# Patient Record
Sex: FEMALE | Race: BLACK OR AFRICAN AMERICAN | NOT HISPANIC OR LATINO | URBAN - METROPOLITAN AREA
[De-identification: names, ages, dates, MRNs, and addresses within clinical notes are randomized per-mention and may not be internally consistent; named-entity substitution may affect disease eponyms.]

---

## 2017-09-28 ENCOUNTER — EMERGENCY (EMERGENCY)
Facility: HOSPITAL | Age: 57
LOS: 1 days | Discharge: HOME | End: 2017-09-28
Attending: EMERGENCY MEDICINE | Admitting: EMERGENCY MEDICINE
Payer: COMMERCIAL

## 2017-09-28 VITALS
TEMPERATURE: 98 F | RESPIRATION RATE: 18 BRPM | OXYGEN SATURATION: 99 % | HEART RATE: 79 BPM | DIASTOLIC BLOOD PRESSURE: 122 MMHG | WEIGHT: 158.07 LBS | SYSTOLIC BLOOD PRESSURE: 187 MMHG | HEIGHT: 62 IN

## 2017-09-28 VITALS
OXYGEN SATURATION: 99 % | RESPIRATION RATE: 18 BRPM | DIASTOLIC BLOOD PRESSURE: 89 MMHG | SYSTOLIC BLOOD PRESSURE: 138 MMHG | HEART RATE: 82 BPM

## 2017-09-28 DIAGNOSIS — R42 DIZZINESS AND GIDDINESS: ICD-10-CM

## 2017-09-28 DIAGNOSIS — R51 HEADACHE: ICD-10-CM

## 2017-09-28 DIAGNOSIS — I10 ESSENTIAL (PRIMARY) HYPERTENSION: ICD-10-CM

## 2017-09-28 DIAGNOSIS — Z90.710 ACQUIRED ABSENCE OF BOTH CERVIX AND UTERUS: Chronic | ICD-10-CM

## 2017-09-28 DIAGNOSIS — E11.9 TYPE 2 DIABETES MELLITUS WITHOUT COMPLICATIONS: ICD-10-CM

## 2017-09-28 PROBLEM — Z00.00 ENCOUNTER FOR PREVENTIVE HEALTH EXAMINATION: Status: ACTIVE | Noted: 2017-09-28

## 2017-09-28 LAB
ALBUMIN SERPL ELPH-MCNC: 4.5 G/DL — SIGNIFICANT CHANGE UP (ref 3.3–5)
ALP SERPL-CCNC: 90 U/L — SIGNIFICANT CHANGE UP (ref 40–120)
ALT FLD-CCNC: 28 U/L — SIGNIFICANT CHANGE UP (ref 10–45)
ANION GAP SERPL CALC-SCNC: 16 MMOL/L — SIGNIFICANT CHANGE UP (ref 5–17)
AST SERPL-CCNC: 27 U/L — SIGNIFICANT CHANGE UP (ref 10–40)
BASOPHILS NFR BLD AUTO: 0.5 % — SIGNIFICANT CHANGE UP (ref 0–2)
BILIRUB SERPL-MCNC: 0.2 MG/DL — SIGNIFICANT CHANGE UP (ref 0.2–1.2)
BUN SERPL-MCNC: 19 MG/DL — SIGNIFICANT CHANGE UP (ref 7–23)
CALCIUM SERPL-MCNC: 10.2 MG/DL — SIGNIFICANT CHANGE UP (ref 8.4–10.5)
CHLORIDE SERPL-SCNC: 101 MMOL/L — SIGNIFICANT CHANGE UP (ref 96–108)
CO2 SERPL-SCNC: 23 MMOL/L — SIGNIFICANT CHANGE UP (ref 22–31)
CREAT SERPL-MCNC: 1.32 MG/DL — HIGH (ref 0.5–1.3)
EOSINOPHIL NFR BLD AUTO: 2 % — SIGNIFICANT CHANGE UP (ref 0–6)
GLUCOSE SERPL-MCNC: 125 MG/DL — HIGH (ref 70–99)
HCT VFR BLD CALC: 35.9 % — SIGNIFICANT CHANGE UP (ref 34.5–45)
HGB BLD-MCNC: 11.8 G/DL — SIGNIFICANT CHANGE UP (ref 11.5–15.5)
LYMPHOCYTES # BLD AUTO: 39.5 % — SIGNIFICANT CHANGE UP (ref 13–44)
MCHC RBC-ENTMCNC: 24.6 PG — LOW (ref 27–34)
MCHC RBC-ENTMCNC: 32.9 G/DL — SIGNIFICANT CHANGE UP (ref 32–36)
MCV RBC AUTO: 74.9 FL — LOW (ref 80–100)
MONOCYTES NFR BLD AUTO: 5.6 % — SIGNIFICANT CHANGE UP (ref 2–14)
NEUTROPHILS NFR BLD AUTO: 52.4 % — SIGNIFICANT CHANGE UP (ref 43–77)
PLATELET # BLD AUTO: 231 K/UL — SIGNIFICANT CHANGE UP (ref 150–400)
POTASSIUM SERPL-MCNC: 3.8 MMOL/L — SIGNIFICANT CHANGE UP (ref 3.5–5.3)
POTASSIUM SERPL-SCNC: 3.8 MMOL/L — SIGNIFICANT CHANGE UP (ref 3.5–5.3)
PROT SERPL-MCNC: 8 G/DL — SIGNIFICANT CHANGE UP (ref 6–8.3)
RBC # BLD: 4.79 M/UL — SIGNIFICANT CHANGE UP (ref 3.8–5.2)
RBC # FLD: 14.6 % — SIGNIFICANT CHANGE UP (ref 10.3–16.9)
SODIUM SERPL-SCNC: 140 MMOL/L — SIGNIFICANT CHANGE UP (ref 135–145)
WBC # BLD: 5.5 K/UL — SIGNIFICANT CHANGE UP (ref 3.8–10.5)
WBC # FLD AUTO: 5.5 K/UL — SIGNIFICANT CHANGE UP (ref 3.8–10.5)

## 2017-09-28 PROCEDURE — 80053 COMPREHEN METABOLIC PANEL: CPT

## 2017-09-28 PROCEDURE — 93010 ELECTROCARDIOGRAM REPORT: CPT

## 2017-09-28 PROCEDURE — 70450 CT HEAD/BRAIN W/O DYE: CPT | Mod: 26

## 2017-09-28 PROCEDURE — 36415 COLL VENOUS BLD VENIPUNCTURE: CPT

## 2017-09-28 PROCEDURE — 96374 THER/PROPH/DIAG INJ IV PUSH: CPT

## 2017-09-28 PROCEDURE — 96375 TX/PRO/DX INJ NEW DRUG ADDON: CPT

## 2017-09-28 PROCEDURE — 99285 EMERGENCY DEPT VISIT HI MDM: CPT | Mod: 25

## 2017-09-28 PROCEDURE — 70450 CT HEAD/BRAIN W/O DYE: CPT

## 2017-09-28 PROCEDURE — 99284 EMERGENCY DEPT VISIT MOD MDM: CPT | Mod: 25

## 2017-09-28 PROCEDURE — 85025 COMPLETE CBC W/AUTO DIFF WBC: CPT

## 2017-09-28 PROCEDURE — 93005 ELECTROCARDIOGRAM TRACING: CPT

## 2017-09-28 RX ORDER — ACETAMINOPHEN 500 MG
975 TABLET ORAL ONCE
Qty: 0 | Refills: 0 | Status: COMPLETED | OUTPATIENT
Start: 2017-09-28 | End: 2017-09-28

## 2017-09-28 RX ORDER — DIPHENHYDRAMINE HCL 50 MG
25 CAPSULE ORAL ONCE
Qty: 0 | Refills: 0 | Status: COMPLETED | OUTPATIENT
Start: 2017-09-28 | End: 2017-09-28

## 2017-09-28 RX ORDER — METOCLOPRAMIDE HCL 10 MG
10 TABLET ORAL ONCE
Qty: 0 | Refills: 0 | Status: COMPLETED | OUTPATIENT
Start: 2017-09-28 | End: 2017-09-28

## 2017-09-28 RX ADMIN — Medication 104 MILLIGRAM(S): at 01:43

## 2017-09-28 RX ADMIN — Medication 25 MILLIGRAM(S): at 01:43

## 2017-09-28 RX ADMIN — Medication 975 MILLIGRAM(S): at 01:43

## 2017-09-28 NOTE — ED PROVIDER NOTE - MEDICAL DECISION MAKING DETAILS
HA on and off x2 weeks, elevated BP, no focal neuro deficits  -check labs, reglan/benadryl/tylenol, CT head

## 2017-09-28 NOTE — ED PROVIDER NOTE - OBJECTIVE STATEMENT
57F hx htn, dm, c/o elevated BP. pt states she checked her BP at home tonight and it was 160s/120s.  pt states she has been compliant with her BP medication. states took her meds tonight around 8P.  also c/o intermittent headache over past 2 weeks. states HA diffuse, pressurelike.  mild photophobia. no n/v. +dizziness. no chest pain, no numbness/weakness. no fevers. no SOB.

## 2017-09-28 NOTE — ED ADULT NURSE NOTE - OBJECTIVE STATEMENT
pt received in room 6 a & o x 3 pt c/o HTN for past few days , pt also c/o headache , dizziness , pt denies any chest pain or SOB , Iv was accessed labs sent will continue to monitor

## 2017-09-28 NOTE — ED ADULT TRIAGE NOTE - ARRIVAL INFO ADDITIONAL COMMENTS
c.o elevated blood pressure and headache for 2 weeks with intermittent numbness and tingling to left side of face for a few hours today. facial symmtry noted. equal strength noted to all extremities. no drift noted. denies any nausea, vomiting, blurry vision, dizziness, fever/chills, chest pain or palpitations.

## 2017-09-28 NOTE — ED PROVIDER NOTE - PROGRESS NOTE DETAILS
pt feeling much better, GORDILLO resolved, /89 without intervention. pt states has PMD appt tomorrow. recommend neuro f/u  I have discussed the discharge plan with the patient. The patient agrees with the plan, as discussed.  The patient understands Emergency Department diagnosis is a preliminary diagnosis often based on limited information and that the patient must adhere to the follow-up plan as discussed.  The patient understands that if the symptoms worsen or if prescribed medications do not have the desired/planned effect that the patient may return to the Emergency Department at any time for further evaluation and treatment.

## 2017-11-07 ENCOUNTER — APPOINTMENT (OUTPATIENT)
Dept: NEPHROLOGY | Facility: CLINIC | Age: 57
End: 2017-11-07
Payer: COMMERCIAL

## 2017-11-07 VITALS — DIASTOLIC BLOOD PRESSURE: 100 MMHG | SYSTOLIC BLOOD PRESSURE: 138 MMHG | HEART RATE: 88 BPM

## 2017-11-07 VITALS — WEIGHT: 164 LBS | BODY MASS INDEX: 30.18 KG/M2 | HEIGHT: 62 IN

## 2017-11-07 VITALS — SYSTOLIC BLOOD PRESSURE: 116 MMHG | DIASTOLIC BLOOD PRESSURE: 77 MMHG

## 2017-11-07 DIAGNOSIS — Z90.710 ACQUIRED ABSENCE OF BOTH CERVIX AND UTERUS: ICD-10-CM

## 2017-11-07 DIAGNOSIS — Z84.1 FAMILY HISTORY OF DISORDERS OF KIDNEY AND URETER: ICD-10-CM

## 2017-11-07 DIAGNOSIS — Z78.9 OTHER SPECIFIED HEALTH STATUS: ICD-10-CM

## 2017-11-07 PROCEDURE — 36415 COLL VENOUS BLD VENIPUNCTURE: CPT

## 2017-11-07 PROCEDURE — 99204 OFFICE O/P NEW MOD 45 MIN: CPT | Mod: 25

## 2017-11-09 LAB
25(OH)D3 SERPL-MCNC: 36.1 NG/ML
ALBUMIN SERPL ELPH-MCNC: 4.6 G/DL
ALP BLD-CCNC: 98 U/L
ALT SERPL-CCNC: 26 U/L
ANION GAP SERPL CALC-SCNC: 19 MMOL/L
APPEARANCE: CLEAR
AST SERPL-CCNC: 25 U/L
BASOPHILS # BLD AUTO: 0.03 K/UL
BASOPHILS NFR BLD AUTO: 0.6 %
BILIRUB SERPL-MCNC: 0.2 MG/DL
BILIRUBIN URINE: NEGATIVE
BLOOD URINE: NEGATIVE
BUN SERPL-MCNC: 21 MG/DL
CALCIUM SERPL-MCNC: 10.2 MG/DL
CALCIUM SERPL-MCNC: 10.2 MG/DL
CHLORIDE SERPL-SCNC: 103 MMOL/L
CHOLEST SERPL-MCNC: 133 MG/DL
CHOLEST/HDLC SERPL: 2.4 RATIO
CO2 SERPL-SCNC: 21 MMOL/L
COLOR: YELLOW
CREAT SERPL-MCNC: 1.34 MG/DL
CREAT SPEC-SCNC: 63 MG/DL
EOSINOPHIL # BLD AUTO: 0.14 K/UL
EOSINOPHIL NFR BLD AUTO: 3 %
GLUCOSE QUALITATIVE U: NEGATIVE MG/DL
GLUCOSE SERPL-MCNC: 129 MG/DL
HBA1C MFR BLD HPLC: 7.4 %
HCT VFR BLD CALC: 36.3 %
HDLC SERPL-MCNC: 55 MG/DL
HGB BLD-MCNC: 11.9 G/DL
IMM GRANULOCYTES NFR BLD AUTO: 0.2 %
KETONES URINE: NEGATIVE
LDLC SERPL CALC-MCNC: 46 MG/DL
LEUKOCYTE ESTERASE URINE: NEGATIVE
LYMPHOCYTES # BLD AUTO: 2.21 K/UL
LYMPHOCYTES NFR BLD AUTO: 46.6 %
MAGNESIUM SERPL-MCNC: 1.6 MG/DL
MAN DIFF?: NORMAL
MCHC RBC-ENTMCNC: 24.9 PG
MCHC RBC-ENTMCNC: 32.8 GM/DL
MCV RBC AUTO: 76.1 FL
MICROALBUMIN 24H UR DL<=1MG/L-MCNC: 2.2 MG/DL
MICROALBUMIN/CREAT 24H UR-RTO: 35 MG/G
MONOCYTES # BLD AUTO: 0.27 K/UL
MONOCYTES NFR BLD AUTO: 5.7 %
NEUTROPHILS # BLD AUTO: 2.08 K/UL
NEUTROPHILS NFR BLD AUTO: 43.9 %
NITRITE URINE: NEGATIVE
PARATHYROID HORMONE INTACT: 62 PG/ML
PH URINE: 5.5
PHOSPHATE SERPL-MCNC: 2.8 MG/DL
PLATELET # BLD AUTO: 237 K/UL
POTASSIUM SERPL-SCNC: 3.8 MMOL/L
PROT SERPL-MCNC: 7.8 G/DL
PROTEIN URINE: NEGATIVE MG/DL
RBC # BLD: 4.77 M/UL
RBC # FLD: 14.7 %
SODIUM SERPL-SCNC: 143 MMOL/L
SPECIFIC GRAVITY URINE: 1.01
TRIGL SERPL-MCNC: 158 MG/DL
TSH SERPL-ACNC: 1.5 UIU/ML
URATE SERPL-MCNC: 4.9 MG/DL
UROBILINOGEN URINE: NEGATIVE MG/DL
VIT B12 SERPL-MCNC: 741 PG/ML
WBC # FLD AUTO: 4.74 K/UL

## 2019-08-04 ENCOUNTER — EMERGENCY (EMERGENCY)
Facility: HOSPITAL | Age: 59
LOS: 1 days | Discharge: HOME | End: 2019-08-04
Attending: EMERGENCY MEDICINE | Admitting: EMERGENCY MEDICINE
Payer: COMMERCIAL

## 2019-08-04 VITALS
OXYGEN SATURATION: 98 % | SYSTOLIC BLOOD PRESSURE: 143 MMHG | TEMPERATURE: 98 F | RESPIRATION RATE: 18 BRPM | HEART RATE: 82 BPM | HEIGHT: 62 IN | WEIGHT: 153 LBS | DIASTOLIC BLOOD PRESSURE: 89 MMHG

## 2019-08-04 VITALS
RESPIRATION RATE: 18 BRPM | DIASTOLIC BLOOD PRESSURE: 84 MMHG | OXYGEN SATURATION: 98 % | TEMPERATURE: 98 F | HEART RATE: 80 BPM | SYSTOLIC BLOOD PRESSURE: 120 MMHG

## 2019-08-04 DIAGNOSIS — Z90.710 ACQUIRED ABSENCE OF BOTH CERVIX AND UTERUS: Chronic | ICD-10-CM

## 2019-08-04 PROBLEM — E11.9 TYPE 2 DIABETES MELLITUS WITHOUT COMPLICATIONS: Chronic | Status: ACTIVE | Noted: 2017-09-28

## 2019-08-04 PROBLEM — I10 ESSENTIAL (PRIMARY) HYPERTENSION: Chronic | Status: ACTIVE | Noted: 2017-09-28

## 2019-08-04 LAB
ALBUMIN SERPL ELPH-MCNC: 4.6 G/DL — SIGNIFICANT CHANGE UP (ref 3.3–5)
ALP SERPL-CCNC: 93 U/L — SIGNIFICANT CHANGE UP (ref 40–120)
ALT FLD-CCNC: 23 U/L — SIGNIFICANT CHANGE UP (ref 10–45)
ANION GAP SERPL CALC-SCNC: 16 MMOL/L — SIGNIFICANT CHANGE UP (ref 5–17)
AST SERPL-CCNC: 24 U/L — SIGNIFICANT CHANGE UP (ref 10–40)
BASOPHILS # BLD AUTO: 0.03 K/UL — SIGNIFICANT CHANGE UP (ref 0–0.2)
BASOPHILS NFR BLD AUTO: 0.5 % — SIGNIFICANT CHANGE UP (ref 0–2)
BILIRUB SERPL-MCNC: 0.3 MG/DL — SIGNIFICANT CHANGE UP (ref 0.2–1.2)
BUN SERPL-MCNC: 18 MG/DL — SIGNIFICANT CHANGE UP (ref 7–23)
CALCIUM SERPL-MCNC: 10 MG/DL — SIGNIFICANT CHANGE UP (ref 8.4–10.5)
CHLORIDE SERPL-SCNC: 99 MMOL/L — SIGNIFICANT CHANGE UP (ref 96–108)
CO2 SERPL-SCNC: 26 MMOL/L — SIGNIFICANT CHANGE UP (ref 22–31)
CREAT SERPL-MCNC: 1.5 MG/DL — HIGH (ref 0.5–1.3)
EOSINOPHIL # BLD AUTO: 0.05 K/UL — SIGNIFICANT CHANGE UP (ref 0–0.5)
EOSINOPHIL NFR BLD AUTO: 0.8 % — SIGNIFICANT CHANGE UP (ref 0–6)
GLUCOSE SERPL-MCNC: 95 MG/DL — SIGNIFICANT CHANGE UP (ref 70–99)
HCT VFR BLD CALC: 40.7 % — SIGNIFICANT CHANGE UP (ref 34.5–45)
HGB BLD-MCNC: 13.1 G/DL — SIGNIFICANT CHANGE UP (ref 11.5–15.5)
IMM GRANULOCYTES NFR BLD AUTO: 0.2 % — SIGNIFICANT CHANGE UP (ref 0–1.5)
LYMPHOCYTES # BLD AUTO: 2.2 K/UL — SIGNIFICANT CHANGE UP (ref 1–3.3)
LYMPHOCYTES # BLD AUTO: 34.9 % — SIGNIFICANT CHANGE UP (ref 13–44)
MCHC RBC-ENTMCNC: 25.1 PG — LOW (ref 27–34)
MCHC RBC-ENTMCNC: 32.2 GM/DL — SIGNIFICANT CHANGE UP (ref 32–36)
MCV RBC AUTO: 78 FL — LOW (ref 80–100)
MONOCYTES # BLD AUTO: 0.53 K/UL — SIGNIFICANT CHANGE UP (ref 0–0.9)
MONOCYTES NFR BLD AUTO: 8.4 % — SIGNIFICANT CHANGE UP (ref 2–14)
NEUTROPHILS # BLD AUTO: 3.49 K/UL — SIGNIFICANT CHANGE UP (ref 1.8–7.4)
NEUTROPHILS NFR BLD AUTO: 55.2 % — SIGNIFICANT CHANGE UP (ref 43–77)
NRBC # BLD: 0 /100 WBCS — SIGNIFICANT CHANGE UP (ref 0–0)
PLATELET # BLD AUTO: 250 K/UL — SIGNIFICANT CHANGE UP (ref 150–400)
POTASSIUM SERPL-MCNC: 3.7 MMOL/L — SIGNIFICANT CHANGE UP (ref 3.5–5.3)
POTASSIUM SERPL-SCNC: 3.7 MMOL/L — SIGNIFICANT CHANGE UP (ref 3.5–5.3)
PROT SERPL-MCNC: 8.3 G/DL — SIGNIFICANT CHANGE UP (ref 6–8.3)
RBC # BLD: 5.22 M/UL — HIGH (ref 3.8–5.2)
RBC # FLD: 13.3 % — SIGNIFICANT CHANGE UP (ref 10.3–14.5)
SODIUM SERPL-SCNC: 141 MMOL/L — SIGNIFICANT CHANGE UP (ref 135–145)
WBC # BLD: 6.31 K/UL — SIGNIFICANT CHANGE UP (ref 3.8–10.5)
WBC # FLD AUTO: 6.31 K/UL — SIGNIFICANT CHANGE UP (ref 3.8–10.5)

## 2019-08-04 PROCEDURE — 99284 EMERGENCY DEPT VISIT MOD MDM: CPT

## 2019-08-04 PROCEDURE — 96374 THER/PROPH/DIAG INJ IV PUSH: CPT

## 2019-08-04 PROCEDURE — 93010 ELECTROCARDIOGRAM REPORT: CPT

## 2019-08-04 PROCEDURE — 85025 COMPLETE CBC W/AUTO DIFF WBC: CPT

## 2019-08-04 PROCEDURE — 82962 GLUCOSE BLOOD TEST: CPT

## 2019-08-04 PROCEDURE — 93005 ELECTROCARDIOGRAM TRACING: CPT

## 2019-08-04 PROCEDURE — 99284 EMERGENCY DEPT VISIT MOD MDM: CPT | Mod: 25

## 2019-08-04 PROCEDURE — 80053 COMPREHEN METABOLIC PANEL: CPT

## 2019-08-04 PROCEDURE — 36415 COLL VENOUS BLD VENIPUNCTURE: CPT

## 2019-08-04 RX ORDER — SODIUM CHLORIDE 9 MG/ML
1000 INJECTION INTRAMUSCULAR; INTRAVENOUS; SUBCUTANEOUS ONCE
Refills: 0 | Status: COMPLETED | OUTPATIENT
Start: 2019-08-04 | End: 2019-08-04

## 2019-08-04 RX ORDER — SITAGLIPTIN AND METFORMIN HYDROCHLORIDE 500; 50 MG/1; MG/1
1 TABLET, FILM COATED ORAL
Qty: 0 | Refills: 0 | DISCHARGE

## 2019-08-04 RX ORDER — AMLODIPINE BESYLATE 2.5 MG/1
1 TABLET ORAL
Qty: 0 | Refills: 0 | DISCHARGE

## 2019-08-04 RX ORDER — METOCLOPRAMIDE HCL 10 MG
1 TABLET ORAL
Qty: 20 | Refills: 0
Start: 2019-08-04

## 2019-08-04 RX ORDER — ROSUVASTATIN CALCIUM 5 MG/1
1 TABLET ORAL
Qty: 0 | Refills: 0 | DISCHARGE

## 2019-08-04 RX ORDER — METOCLOPRAMIDE HCL 10 MG
10 TABLET ORAL ONCE
Refills: 0 | Status: COMPLETED | OUTPATIENT
Start: 2019-08-04 | End: 2019-08-04

## 2019-08-04 RX ORDER — OLMESARTAN MEDOXOMIL 5 MG/1
1 TABLET, FILM COATED ORAL
Qty: 0 | Refills: 0 | DISCHARGE

## 2019-08-04 RX ADMIN — Medication 10 MILLIGRAM(S): at 16:25

## 2019-08-04 RX ADMIN — SODIUM CHLORIDE 1000 MILLILITER(S): 9 INJECTION INTRAMUSCULAR; INTRAVENOUS; SUBCUTANEOUS at 16:25

## 2019-08-04 NOTE — ED ADULT NURSE NOTE - IS THE PATIENT ABLE TO BE SCREENED?
Verified Results  XR SPINE CERVICAL 3V [id number] 09:24AM BLANCA VIVIAN   [May 2, 2018 4:56PM IVVIAN RIOS]  x ray cervical spine shows no disc probelms but severe neck muscle spasm.  please take meds as prescribed nad follow up in 2 weeks     Test Name Result Flag Reference   XR SPINE CERVICAL 3V (Report)     Accession #    [id number]    EXAM DATE: 5/2/2018 9:24 AM    PROCEDURE: XR SPINE CERVICAL 3V    CLINICAL INDICATION: Age: 39 years . Gender: Female.  Stated history: \" \" Additional history: None., LT SHOULDER \T\ ARM PAIN; MUSCLE SPASM    COMPARISON: None.    TECHNIQUE:   AP, lateral, dens views of the cervical spine, three views    FINDINGS:   Cervical spine is imaged from the cranial cervical junction through the superior plate of T1.   There is straightening of the normal cervical lordosis. Vertebral body heights are maintained   without acute fracture or traumatic subluxation. Intervertebral disc spaces are maintained. No   significant posterior or uncovertebral facet arthrosis. Prevertebral soft tissue thickness is   normal. The dens is intact.    IMPRESSION:  1.  Straightening of the normal cervical lordosis. Otherwise unremarkable radiographs of the   cervical spine.    **** F I N A L ****    Transcribed By: MARILYN   05/02/18 11:05 am    Dictated By:      NIELS GRANDA MD    Electronically Reviewed and Approved By:      NIELS GRANDA MD 05/02/18 11:05 am        Yes

## 2019-08-04 NOTE — ED PROVIDER NOTE - NSFOLLOWUPINSTRUCTIONS_ED_ALL_ED_FT
Can take tylenol every 6hrs as needed for mild pain.  Can take reglan as prescribed for dizziness.  Stay well hydrated.  Return for worsening dizziness, fevers, persistent vomit, uncontrolled pain, worsening breathing, worsening lightheaded, focal weakness/numbness, vision changes.  Follow up with primary doctor within 1-2 days.   Follow up with neurologist within 1-2 days.

## 2019-08-04 NOTE — ED ADULT TRIAGE NOTE - OTHER COMPLAINTS
pt states she has been having headaches, was seen by neurologist but she is now worse with dizziness that is not letting her function. pt states Tylenol and Advil help her pain but only for short periods of time. pt c/o having intermittent hypertension with her symptoms.

## 2019-08-04 NOTE — ED ADULT TRIAGE NOTE - CHIEF COMPLAINT QUOTE
"I have been having dizziness for about 4 days now, with nausea and I was taking Antivert for 2 days with no improvement".

## 2019-08-04 NOTE — ED PROVIDER NOTE - PHYSICAL EXAMINATION
no LE edema, normal equal distal pulses.  PERRL, EOMI, no nystagmus. CN intact. Strength 5/5. Normal F-->N, H-->S. Steady unassisted gait. No pronator drift. Sensation intact. No carotid bruits.

## 2019-08-04 NOTE — ED PROVIDER NOTE - OBJECTIVE STATEMENT
59F PMH HTN, DM, p/w dizziness. For ~1w has sensation of room spinning and off balance, only w/ movement. +NBNB yesterday but toelrating PO since then. Went to neuro who has set up outpt imaging  and nerve testing. Came to ED for persistence, no acute changes. HAs occasional intermittent frontal HA, similar to prior, none currently. Hx of dizziness prior ~10yrs ago. Pt took antivert at home w/ minimal relief. Denies vision changes, focal weakness/numbness, rashes, tinnitus, hearing changes, URI symptoms, f/c, SOB/CP, NVD, abd pain, urinary complaints, black/bloody stool.  CTH 9/2017 w/ no acute pathology.

## 2019-08-04 NOTE — ED ADULT NURSE NOTE - NSIMPLEMENTINTERV_GEN_ALL_ED
Implemented All Universal Safety Interventions:  [place] to call system. Call bell, personal items and telephone within reach. Instruct patient to call for assistance. Room bathroom lighting operational. Non-slip footwear when patient is off stretcher. Physically safe environment: no spills, clutter or unnecessary equipment. Stretcher in lowest position, wheels locked, appropriate side rails in place.

## 2019-08-04 NOTE — ED PROVIDER NOTE - CLINICAL SUMMARY MEDICAL DECISION MAKING FREE TEXT BOX
59F PMH HTN, DM, p/w dizziness. For ~1w has sensation of room spinning and off balance, only w/ movement. +NBNB yesterday but toelrating PO since then. Went to neuro who has set up outpt imaging  and nerve testing. Came to ED for persistence, no acute changes. HAs occasional intermittent frontal HA, similar to prior, none currently. Hx of dizziness prior ~10yrs ago. Pt took antivert at home w/ minimal relief. No other systemic symptoms. Vitals wnl, exam as above.  ddx: Likely peripheral vertigo  BAsic labs, IVf, symptom control, reassess.

## 2019-08-04 NOTE — ED ADULT NURSE NOTE - OBJECTIVE STATEMENT
60 y/o F a&ox4 walked in from front triage c/o dizziness.  PMH of HTN, DM type II, vertigo. intermittent dizziness x 1 mo. 3 weeks ago PCP changed her HTN medication (taken off tribenzor) saw neurologist on 7/29/19 for intermittent dizziness, needs to follow up at the end of the month for balance testing. came in today due to worsening dizziness x 4/5 days, unrelieved by meclizine. + nausea, x 1 episode of vomiting yesterday, denies headaches, numbness/ tingling, SOB, chest pain, fevers, chills, abd pain, change in urination, change in bowel patterns, no change in appetite . EKG complete in triage. FS 93.

## 2019-08-09 DIAGNOSIS — I10 ESSENTIAL (PRIMARY) HYPERTENSION: ICD-10-CM

## 2019-08-09 DIAGNOSIS — Z79.899 OTHER LONG TERM (CURRENT) DRUG THERAPY: ICD-10-CM

## 2019-08-09 DIAGNOSIS — R42 DIZZINESS AND GIDDINESS: ICD-10-CM

## 2019-08-09 DIAGNOSIS — E11.9 TYPE 2 DIABETES MELLITUS WITHOUT COMPLICATIONS: ICD-10-CM

## 2019-08-09 DIAGNOSIS — R51 HEADACHE: ICD-10-CM

## 2020-09-24 ENCOUNTER — RESULT REVIEW (OUTPATIENT)
Age: 60
End: 2020-09-24

## 2022-06-02 ENCOUNTER — RESULT REVIEW (OUTPATIENT)
Age: 62
End: 2022-06-02

## 2022-07-15 NOTE — ED ADULT NURSE NOTE - NS PRO PASSIVE SMOKE EXP
Requested medication(s) are due for refill today: Yes  Patient has already received a courtesy refill: No  Other reason request has been forwarded to provider: failed protocol
No

## 2022-12-08 ENCOUNTER — APPOINTMENT (OUTPATIENT)
Dept: ENDOCRINOLOGY | Facility: CLINIC | Age: 62
End: 2022-12-08

## 2022-12-08 VITALS
BODY MASS INDEX: 27.6 KG/M2 | SYSTOLIC BLOOD PRESSURE: 146 MMHG | DIASTOLIC BLOOD PRESSURE: 77 MMHG | WEIGHT: 150 LBS | HEIGHT: 62 IN | HEART RATE: 75 BPM

## 2022-12-08 DIAGNOSIS — H40.9 UNSPECIFIED GLAUCOMA: ICD-10-CM

## 2022-12-08 LAB
GLUCOSE BLDC GLUCOMTR-MCNC: 180
HBA1C MFR BLD HPLC: 7.4

## 2022-12-08 PROCEDURE — 82962 GLUCOSE BLOOD TEST: CPT

## 2022-12-08 PROCEDURE — 99205 OFFICE O/P NEW HI 60 MIN: CPT | Mod: 25

## 2022-12-08 PROCEDURE — 83036 HEMOGLOBIN GLYCOSYLATED A1C: CPT | Mod: QW

## 2022-12-08 RX ORDER — ASPIRIN 81 MG
81 TABLET, DELAYED RELEASE (ENTERIC COATED) ORAL
Refills: 0 | Status: ACTIVE | COMMUNITY

## 2022-12-08 RX ORDER — ASPIRIN 81 MG/1
81 TABLET, CHEWABLE ORAL
Qty: 30 | Refills: 0 | Status: DISCONTINUED | COMMUNITY
Start: 2017-11-07 | End: 2022-12-08

## 2022-12-08 RX ORDER — OLMESARTAN MEDOXOMIL 40 MG/1
40 TABLET, FILM COATED ORAL
Refills: 0 | Status: ACTIVE | COMMUNITY

## 2022-12-08 RX ORDER — OMEPRAZOLE 20 MG/1
TABLET, DELAYED RELEASE ORAL
Refills: 0 | Status: ACTIVE | COMMUNITY

## 2022-12-08 RX ORDER — OLMESARTAN MEDOXOMIL / AMLODIPINE BESYLATE / HYDROCHLOROTHIAZIDE 10; 25; 40 MG/1; MG/1; MG/1
TABLET, FILM COATED ORAL
Refills: 0 | Status: DISCONTINUED | COMMUNITY
End: 2022-12-08

## 2022-12-08 RX ORDER — SITAGLIPTIN 50 MG/1
50 TABLET, FILM COATED ORAL
Qty: 90 | Refills: 0 | Status: DISCONTINUED | COMMUNITY
Start: 2022-05-13 | End: 2022-12-08

## 2022-12-08 RX ORDER — BLOOD SUGAR DIAGNOSTIC
STRIP MISCELLANEOUS
Qty: 25 | Refills: 0 | Status: ACTIVE | COMMUNITY
Start: 2021-11-08

## 2022-12-08 RX ORDER — SITAGLIPTIN AND METFORMIN HYDROCHLORIDE 50; 1000 MG/1; MG/1
TABLET, FILM COATED ORAL
Refills: 0 | Status: DISCONTINUED | COMMUNITY
End: 2022-12-08

## 2022-12-08 RX ORDER — GLIMEPIRIDE 4 MG/1
TABLET ORAL
Refills: 0 | Status: DISCONTINUED | COMMUNITY
End: 2022-12-08

## 2022-12-08 RX ORDER — SPIRONOLACTONE 25 MG/1
25 TABLET ORAL
Qty: 90 | Refills: 0 | Status: ACTIVE | COMMUNITY
Start: 2022-09-29

## 2022-12-08 RX ORDER — ROSUVASTATIN CALCIUM 10 MG/1
10 TABLET, FILM COATED ORAL
Qty: 90 | Refills: 0 | Status: ACTIVE | COMMUNITY
Start: 2022-05-13

## 2022-12-08 RX ORDER — ATENOLOL 50 MG/1
TABLET ORAL
Refills: 0 | Status: DISCONTINUED | COMMUNITY
End: 2022-12-08

## 2022-12-08 RX ORDER — ALLOPURINOL 200 MG/1
TABLET ORAL
Refills: 0 | Status: DISCONTINUED | COMMUNITY
End: 2022-12-08

## 2022-12-12 NOTE — ED PROVIDER NOTE - NS ED MD DISPO DISCHARGE CCDA
Gary Landrum was seen today for   Chief Complaint   Patient presents with   • Office Visit   • Hearing Loss   • Hearing Aid Check   . Patient was referred by PCP.    Patient reports : hearing loss bilaterally . Annual audiogram and hearing aid check.    Otoscopy revealed Clear external auditory canals and both tympanic membranes visualized bilaterally.    Audiogram revealed a moderate sensorineural hearing loss bilaterally .    See scanned audiogram for more details. Audiometry was completed without incident.    Recommendations: Follow-up with Dr. Krueger. Schedule for annual audiogram and hearing aid check.   Patient/Caregiver provided printed discharge information.

## 2022-12-29 ENCOUNTER — NON-APPOINTMENT (OUTPATIENT)
Age: 62
End: 2022-12-29

## 2023-01-23 RX ORDER — DULAGLUTIDE 0.75 MG/.5ML
0.75 INJECTION, SOLUTION SUBCUTANEOUS
Qty: 3 | Refills: 1 | Status: DISCONTINUED | COMMUNITY
Start: 2022-12-08 | End: 2023-01-23

## 2023-01-23 RX ORDER — ORAL SEMAGLUTIDE 3 MG/1
3 TABLET ORAL
Qty: 30 | Refills: 0 | Status: COMPLETED | COMMUNITY
Start: 2023-01-23 | End: 2023-02-22

## 2023-01-23 NOTE — HISTORY OF PRESENT ILLNESS
[FreeTextEntry1] : Ms. Phoenix is a 62 year-old woman with a history of type 2 diabetes mellitus, hypertension, hyperlipidemia, stage 3 chronic kidney disease presenting to establish care with me for type 2 diabetes mellitus.\par \par Type 2 diabetes mellitus. Point-of-care HbA1c 7.4% and glucose 180 mg/dL today. Stage 3 chronic kidney disease.\par She was diagnosed with diabetes over 10 years ago. No hospitalizations for hypo- or hyperglycemia.\par She is currently taking metformin 1000 mg daily, Jardiance 25 mg daily, and Januvia 25 mg daily; no recent changes. She was previously on a sulfonylurea. \par She is checking blood sugars daily. Fasting glucose values 110-120s mg/dL.\par She is on a blood pressure regimen\par She is on a statin for cholesterol\par Nephropathy screening: Treated with an angiotenin receptor blocker and followed by nephrology\par Last ophthalmology appointment: September 2022; every three months\par Last podiatry appointment: August 2022; annual\par Last dental appointment: October 2022; every six months\par 24 hour diet recall: breakfast, oatmeal with cinnamon and margarine; lunch, 1/2 ham and cheese sandwich; dinner, turkey meatloaf, mashed potatoes, green beans; tea with honey and lemon, small cup of grapefruit juice, diet Pepsi\par \par She has occasional tingling in her feet. No chest pain or shortness of breath.

## 2023-01-23 NOTE — ADDENDUM
[FreeTextEntry1] : Ms. Phoenix has had pruritus and bruising at the sites of Trulicity injections. We will switch to Rybelsus 3 mg daily for 30 days, then 7 mg daily. 1/23/23

## 2023-01-23 NOTE — PHYSICAL EXAM
[Alert] : alert [Healthy Appearance] : healthy appearance [No Acute Distress] : no acute distress [Normal Sclera/Conjunctiva] : normal sclera/conjunctiva [Normal Hearing] : hearing was normal [No Neck Mass] : no neck mass was observed [No LAD] : no lymphadenopathy [Supple] : the neck was supple [Thyroid Not Enlarged] : the thyroid was not enlarged [No Respiratory Distress] : no respiratory distress [Clear to Auscultation] : lungs were clear to auscultation bilaterally [Normal S1, S2] : normal S1 and S2 [Normal Rate] : heart rate was normal [Regular Rhythm] : with a regular rhythm [No Stigmata of Cushings Syndrome] : no stigmata of Cushings Syndrome [Normal Gait] : normal gait [Acanthosis Nigricans] : acanthosis nigricans present [Normal Insight/Judgement] : insight and judgment were intact [Kyphosis] : no kyphosis present [de-identified] : no moon facies, no supraclavicular fat pads

## 2023-01-23 NOTE — DATA REVIEWED
[FreeTextEntry1] : Laboratories (September 29, 2022) reviewed and significant for: \par BUN/creatinine 22/1.62 mg/dL (eGFR 35 mL/min)\par HbA1c 7.1%

## 2023-01-23 NOTE — ASSESSMENT
[FreeTextEntry1] : Type 2 diabetes mellitus/elevated body mass index. Point-of-care HbA1c 7.4% and glucose 180 mg/dL today. Stage 3 chronic kidney disease. We discussed the cardiovascular and microvascular complications of uncontrolled diabetes. We discussed the importance of diet and exercise and lifestyle modification for glycemic control and weight loss. We discussed referral to nutrition. We discussed pharmacologic options for glycemic control and weight loss. She is amenable to a trial of a GLP-1 receptor agonist. We discussed the risks and benefits of the GLP-1 receptor agonist class, including but not limited to nausea, pancreatitis, medullary thyroid cancer. We reviewed subcutaneous injection technique, storage, and sharps disposal. She successfully administered a dose of Trulicity in the office. \par Continue metformin 1000 mg daily\par Continue Jardiance 25 mg daily\par Start Trulicity 0.75 mg weekly if covered by insurance (samples given)\par Stop Januvia 25 mg daily due to overlap in action with Trulicity\par Check blood sugars daily, fasting or postprandial; reviewed glycemic goals\par She is on a blood pressure regimen\par She is on a statin for cholesterol\par Nephropathy screening: Treated with an angiotenin receptor blocker and followed by nephrology\par Last ophthalmology appointment: September 2022; every three months\par Last podiatry appointment: August 2022; annual\par Last dental appointment: October 2022; every six months\par \par Return to see me in 3 months. Patient advised to call earlier with significant hypo- or hyperglycemia. \par \par CC:\par Dr. Julian Woods, Fax [phone]

## 2023-02-22 ENCOUNTER — APPOINTMENT (OUTPATIENT)
Dept: SLEEP CENTER | Facility: HOME HEALTH | Age: 63
End: 2023-02-22
Payer: COMMERCIAL

## 2023-02-22 ENCOUNTER — OUTPATIENT (OUTPATIENT)
Dept: OUTPATIENT SERVICES | Facility: HOSPITAL | Age: 63
LOS: 1 days | End: 2023-02-22
Payer: COMMERCIAL

## 2023-02-22 DIAGNOSIS — Z90.710 ACQUIRED ABSENCE OF BOTH CERVIX AND UTERUS: Chronic | ICD-10-CM

## 2023-02-22 PROCEDURE — 95800 SLP STDY UNATTENDED: CPT | Mod: 26

## 2023-02-22 PROCEDURE — 95800 SLP STDY UNATTENDED: CPT

## 2023-02-24 DIAGNOSIS — G47.33 OBSTRUCTIVE SLEEP APNEA (ADULT) (PEDIATRIC): ICD-10-CM

## 2023-02-27 ENCOUNTER — APPOINTMENT (OUTPATIENT)
Dept: ULTRASOUND IMAGING | Facility: HOSPITAL | Age: 63
End: 2023-02-27
Payer: COMMERCIAL

## 2023-02-27 ENCOUNTER — OUTPATIENT (OUTPATIENT)
Dept: OUTPATIENT SERVICES | Facility: HOSPITAL | Age: 63
LOS: 1 days | End: 2023-02-27
Payer: COMMERCIAL

## 2023-02-27 DIAGNOSIS — Z90.710 ACQUIRED ABSENCE OF BOTH CERVIX AND UTERUS: Chronic | ICD-10-CM

## 2023-02-27 PROCEDURE — 76775 US EXAM ABDO BACK WALL LIM: CPT | Mod: 26,59

## 2023-02-27 PROCEDURE — 93976 VASCULAR STUDY: CPT

## 2023-02-27 PROCEDURE — 76770 US EXAM ABDO BACK WALL COMP: CPT

## 2023-02-27 PROCEDURE — 76775 US EXAM ABDO BACK WALL LIM: CPT

## 2023-02-27 PROCEDURE — 93976 VASCULAR STUDY: CPT | Mod: 26

## 2023-04-05 ENCOUNTER — APPOINTMENT (OUTPATIENT)
Dept: ENDOCRINOLOGY | Facility: CLINIC | Age: 63
End: 2023-04-05
Payer: COMMERCIAL

## 2023-04-05 VITALS
HEART RATE: 88 BPM | HEIGHT: 62 IN | DIASTOLIC BLOOD PRESSURE: 73 MMHG | SYSTOLIC BLOOD PRESSURE: 127 MMHG | WEIGHT: 147 LBS | BODY MASS INDEX: 27.05 KG/M2

## 2023-04-05 DIAGNOSIS — E66.3 OVERWEIGHT: ICD-10-CM

## 2023-04-05 LAB
GLUCOSE BLDC GLUCOMTR-MCNC: 256
HBA1C MFR BLD HPLC: 7.1
HBA1C MFR BLD HPLC: 7.1

## 2023-04-05 PROCEDURE — 83036 HEMOGLOBIN GLYCOSYLATED A1C: CPT | Mod: QW

## 2023-04-05 PROCEDURE — 99214 OFFICE O/P EST MOD 30 MIN: CPT | Mod: 25

## 2023-04-05 PROCEDURE — 82962 GLUCOSE BLOOD TEST: CPT

## 2023-04-05 PROCEDURE — G0108 DIAB MANAGE TRN  PER INDIV: CPT

## 2023-04-05 RX ORDER — BISOPROLOL FUMARATE 5 MG/1
5 TABLET, FILM COATED ORAL
Qty: 90 | Refills: 0 | Status: DISCONTINUED | COMMUNITY
Start: 2022-09-29 | End: 2023-04-05

## 2023-04-05 RX ORDER — SEMAGLUTIDE 1.34 MG/ML
2 INJECTION, SOLUTION SUBCUTANEOUS
Qty: 1 | Refills: 0 | Status: COMPLETED | OUTPATIENT
Start: 2023-04-05 | End: 2023-05-05

## 2023-04-05 RX ORDER — AMLODIPINE BESYLATE 10 MG/1
10 TABLET ORAL
Refills: 0 | Status: ACTIVE | COMMUNITY
Start: 2022-09-29

## 2023-04-05 NOTE — PHYSICAL EXAM
[Alert] : alert [Healthy Appearance] : healthy appearance [No Acute Distress] : no acute distress [Normal Sclera/Conjunctiva] : normal sclera/conjunctiva [Normal Hearing] : hearing was normal [No Stigmata of Cushings Syndrome] : no stigmata of Cushings Syndrome [Normal Gait] : normal gait [Acanthosis Nigricans] : acanthosis nigricans present [Normal Insight/Judgement] : insight and judgment were intact [Clear to Auscultation] : lungs were clear to auscultation bilaterally [Kyphosis] : no kyphosis present [de-identified] : no moon facies, no supraclavicular fat pads

## 2023-04-05 NOTE — ASSESSMENT
[FreeTextEntry1] : Type 2 diabetes mellitus/elevated body mass index. Point-of-care HbA1c 7.1% and glucose 256 mg/dL today (recent butter roll with jelly). Stage 3 chronic kidney disease. I congratulated her on her improving glycemic control. We discussed the cardiovascular and microvascular complications of uncontrolled diabetes. We discussed the importance of diet and exercise and lifestyle modification for glycemic control. We discussed referral to nutrition. We discussed pharmacologic options for glycemic control. She is amenable to a trial of Ozempic.\par Continue Jardiance 25 mg daily\par Stop Rybelsus and start Ozempic 0.25 mg weekly for two weeks, then 0.5 mg weekly (samples given)\par Check blood sugars daily, fasting or postprandial; reviewed glycemic goals\par She is on a blood pressure regimen; blood pressure around goal\par She is on a statin for cholesterol; recommend lipid panel with next blood tests\par Nephropathy screening: Treated with an angiotenin receptor blocker and followed by nephrology\par Last ophthalmology appointment: December 2022; every three months\par Last podiatry appointment: August 2022; annual\par Last dental appointment: October 2022; every six months\par \par Return to see me in 3-4 months. Patient advised to call earlier with significant hypo- or hyperglycemia. \par \par CC:\par Dr. Julian Woods, Fax [phone]

## 2023-04-05 NOTE — HISTORY OF PRESENT ILLNESS
[FreeTextEntry1] : Ms. Phoenix is a 63 year-old woman with a history of type 2 diabetes mellitus, hypertension, hyperlipidemia, stage 3 chronic kidney disease presenting for follow-up of type 2 diabetes mellitus. I saw her for an initial visit in December 2022.\par \par Type 2 diabetes mellitus. Point-of-care HbA1c 7.1% and glucose 256 mg/dL today (recent butter roll with jelly); HbA1c 7.4% in December 2022. Stage 3 chronic kidney disease.\par She was diagnosed with diabetes over 10 years ago. No hospitalizations for hypo- or hyperglycemia.\par At her initial visit she was taking metformin 1000 mg daily, Jardiance 25 mg daily, and Januvia 25 mg daily; no recent changes. She was previously on a sulfonylurea. \par In December 2022 we continued metformin 1000 mg daily, continued Jardiance 25 mg daily, stopped Januvia and started Trulicity 0.75 mg weekly. She had bruising with Trulicity and we switched to Rybelsus up to 7 mg daily. Metformin was discontinued by her nephrologist. She is currently taking Jardiance 25 mg daily and Rybelsus 7 mg daily.\par She is checking blood sugars daily\par She is on a blood pressure regimen\par She is on a statin for cholesterol\par Nephropathy screening: Treated with an angiotenin receptor blocker and followed by nephrology\par Last ophthalmology appointment: December 2022; every three months\par Last podiatry appointment: August 2022; annual\par Last dental appointment: October 2022; every six months\par \par Interim History \par In December 2022 we continued metformin 1000 mg daily, continued Jardiance 25 mg daily, stopped Januvia and started Trulicity 0.75 mg weekly. She had bruising with Trulicity and we switched to Rybelsus up to 7 mg daily. Metformin was discontinued by her nephrologist. She is currently taking Jardiance 25 mg daily and Rybelsus 7 mg daily.\par Fasting glucose values 100-140s mg/dL.\par Laboratory results ordered by her other treating providers significant for the below; see scanned results.\par She has had palpitations and is following with a cardiologist.\par She has occasional tingling in her feet. No chest pain or shortness of breath.\par Medical and surgical history, medications, allergies, social and family history reviewed and updated as needed.

## 2023-04-09 ENCOUNTER — NON-APPOINTMENT (OUTPATIENT)
Age: 63
End: 2023-04-09

## 2023-04-10 RX ORDER — BLOOD-GLUCOSE SENSOR
EACH MISCELLANEOUS
Qty: 6 | Refills: 3 | Status: ACTIVE | COMMUNITY
Start: 2023-04-10 | End: 1900-01-01

## 2023-05-08 ENCOUNTER — NON-APPOINTMENT (OUTPATIENT)
Age: 63
End: 2023-05-08

## 2023-05-16 ENCOUNTER — APPOINTMENT (OUTPATIENT)
Dept: ENDOCRINOLOGY | Facility: CLINIC | Age: 63
End: 2023-05-16

## 2023-05-26 ENCOUNTER — EMERGENCY (EMERGENCY)
Facility: HOSPITAL | Age: 63
LOS: 1 days | Discharge: HOME | End: 2023-05-26
Attending: STUDENT IN AN ORGANIZED HEALTH CARE EDUCATION/TRAINING PROGRAM | Admitting: STUDENT IN AN ORGANIZED HEALTH CARE EDUCATION/TRAINING PROGRAM
Payer: COMMERCIAL

## 2023-05-26 VITALS
OXYGEN SATURATION: 97 % | HEART RATE: 87 BPM | SYSTOLIC BLOOD PRESSURE: 119 MMHG | TEMPERATURE: 98 F | DIASTOLIC BLOOD PRESSURE: 75 MMHG | RESPIRATION RATE: 18 BRPM

## 2023-05-26 VITALS
TEMPERATURE: 98 F | RESPIRATION RATE: 18 BRPM | WEIGHT: 144.4 LBS | HEIGHT: 62 IN | DIASTOLIC BLOOD PRESSURE: 92 MMHG | SYSTOLIC BLOOD PRESSURE: 156 MMHG | OXYGEN SATURATION: 96 % | HEART RATE: 85 BPM

## 2023-05-26 DIAGNOSIS — I10 ESSENTIAL (PRIMARY) HYPERTENSION: ICD-10-CM

## 2023-05-26 DIAGNOSIS — R42 DIZZINESS AND GIDDINESS: ICD-10-CM

## 2023-05-26 DIAGNOSIS — G43.909 MIGRAINE, UNSPECIFIED, NOT INTRACTABLE, WITHOUT STATUS MIGRAINOSUS: ICD-10-CM

## 2023-05-26 DIAGNOSIS — Z90.710 ACQUIRED ABSENCE OF BOTH CERVIX AND UTERUS: Chronic | ICD-10-CM

## 2023-05-26 DIAGNOSIS — E11.9 TYPE 2 DIABETES MELLITUS WITHOUT COMPLICATIONS: ICD-10-CM

## 2023-05-26 DIAGNOSIS — Z90.710 ACQUIRED ABSENCE OF BOTH CERVIX AND UTERUS: ICD-10-CM

## 2023-05-26 DIAGNOSIS — Z79.84 LONG TERM (CURRENT) USE OF ORAL HYPOGLYCEMIC DRUGS: ICD-10-CM

## 2023-05-26 LAB
ANION GAP SERPL CALC-SCNC: 7 MMOL/L — SIGNIFICANT CHANGE UP (ref 5–17)
BASOPHILS # BLD AUTO: 0.03 K/UL — SIGNIFICANT CHANGE UP (ref 0–0.2)
BASOPHILS NFR BLD AUTO: 0.7 % — SIGNIFICANT CHANGE UP (ref 0–2)
BUN SERPL-MCNC: 19 MG/DL — SIGNIFICANT CHANGE UP (ref 7–23)
CALCIUM SERPL-MCNC: 10.4 MG/DL — SIGNIFICANT CHANGE UP (ref 8.4–10.5)
CHLORIDE SERPL-SCNC: 105 MMOL/L — SIGNIFICANT CHANGE UP (ref 96–108)
CO2 SERPL-SCNC: 27 MMOL/L — SIGNIFICANT CHANGE UP (ref 22–31)
CREAT SERPL-MCNC: 1.6 MG/DL — HIGH (ref 0.5–1.3)
EGFR: 36 ML/MIN/1.73M2 — LOW
EOSINOPHIL # BLD AUTO: 0.04 K/UL — SIGNIFICANT CHANGE UP (ref 0–0.5)
EOSINOPHIL NFR BLD AUTO: 0.9 % — SIGNIFICANT CHANGE UP (ref 0–6)
GLUCOSE SERPL-MCNC: 119 MG/DL — HIGH (ref 70–99)
HCT VFR BLD CALC: 43.4 % — SIGNIFICANT CHANGE UP (ref 34.5–45)
HGB BLD-MCNC: 13.8 G/DL — SIGNIFICANT CHANGE UP (ref 11.5–15.5)
IMM GRANULOCYTES NFR BLD AUTO: 0.2 % — SIGNIFICANT CHANGE UP (ref 0–0.9)
LYMPHOCYTES # BLD AUTO: 1.49 K/UL — SIGNIFICANT CHANGE UP (ref 1–3.3)
LYMPHOCYTES # BLD AUTO: 33 % — SIGNIFICANT CHANGE UP (ref 13–44)
MAGNESIUM SERPL-MCNC: 2 MG/DL — SIGNIFICANT CHANGE UP (ref 1.6–2.6)
MCHC RBC-ENTMCNC: 25.7 PG — LOW (ref 27–34)
MCHC RBC-ENTMCNC: 31.8 GM/DL — LOW (ref 32–36)
MCV RBC AUTO: 80.7 FL — SIGNIFICANT CHANGE UP (ref 80–100)
MONOCYTES # BLD AUTO: 0.28 K/UL — SIGNIFICANT CHANGE UP (ref 0–0.9)
MONOCYTES NFR BLD AUTO: 6.2 % — SIGNIFICANT CHANGE UP (ref 2–14)
NEUTROPHILS # BLD AUTO: 2.67 K/UL — SIGNIFICANT CHANGE UP (ref 1.8–7.4)
NEUTROPHILS NFR BLD AUTO: 59 % — SIGNIFICANT CHANGE UP (ref 43–77)
NRBC # BLD: 0 /100 WBCS — SIGNIFICANT CHANGE UP (ref 0–0)
PLATELET # BLD AUTO: 236 K/UL — SIGNIFICANT CHANGE UP (ref 150–400)
POTASSIUM SERPL-MCNC: 4.4 MMOL/L — SIGNIFICANT CHANGE UP (ref 3.5–5.3)
POTASSIUM SERPL-SCNC: 4.4 MMOL/L — SIGNIFICANT CHANGE UP (ref 3.5–5.3)
RBC # BLD: 5.38 M/UL — HIGH (ref 3.8–5.2)
RBC # FLD: 13.5 % — SIGNIFICANT CHANGE UP (ref 10.3–14.5)
SODIUM SERPL-SCNC: 139 MMOL/L — SIGNIFICANT CHANGE UP (ref 135–145)
WBC # BLD: 4.52 K/UL — SIGNIFICANT CHANGE UP (ref 3.8–10.5)
WBC # FLD AUTO: 4.52 K/UL — SIGNIFICANT CHANGE UP (ref 3.8–10.5)

## 2023-05-26 PROCEDURE — 85025 COMPLETE CBC W/AUTO DIFF WBC: CPT

## 2023-05-26 PROCEDURE — 93005 ELECTROCARDIOGRAM TRACING: CPT

## 2023-05-26 PROCEDURE — 99283 EMERGENCY DEPT VISIT LOW MDM: CPT | Mod: 25

## 2023-05-26 PROCEDURE — 83735 ASSAY OF MAGNESIUM: CPT

## 2023-05-26 PROCEDURE — 80048 BASIC METABOLIC PNL TOTAL CA: CPT

## 2023-05-26 PROCEDURE — 99284 EMERGENCY DEPT VISIT MOD MDM: CPT

## 2023-05-26 PROCEDURE — 36415 COLL VENOUS BLD VENIPUNCTURE: CPT

## 2023-05-26 NOTE — ED PROVIDER NOTE - PROGRESS NOTE DETAILS
Labs without any emergent etiology for symptoms.  Plan to discharge home with return precautions and outpatient followup.

## 2023-05-26 NOTE — ED ADULT NURSE NOTE - OBJECTIVE STATEMENT
Pt is 63 y.o female pt walked in complaining of dizziness and headache for one week. Pt A&Ox4. Pt is conversive in full sentences and has a steady gait. Pt denies n/v/d, cp, sob, fever, abd pain, chills, numbness, tingling, blurred vision, slurred speech, arm drift or facial droop, dizziness not noted at present. Pt has hx of HTN, DN, HDL. EKG done in triage. Assessment ongoing. Will cont to monitor.

## 2023-05-26 NOTE — ED PROVIDER NOTE - PHYSICAL EXAMINATION
General: comfortable, resting in ED  HEENT: atraumatic, no eye erythema or discharge  Pulm: no cyanosis, no added work of breathing  Cardiac: extremities warm, intact peripheral pulse  GI: no abdominal distension, abdomen nontender  Neuro: alert, conversant, CNII grossly intact bilaterally, CNIII-XII intact bilaterally, 5/5 strength upper and lower extremities bilaterally, intact sensation upper and lower extremities bilaterally, intact finger to nose bilaterally, intact rapid alternating hand movements bilaterally, intact heel down shin bilaterally, walks independently   Psych: neutral affect, cooperative  Msk: no gross deformity or instability  Skin: no erythema or rash

## 2023-05-26 NOTE — ED PROVIDER NOTE - OBJECTIVE STATEMENT
As per patient and review of previous medical records, 63F with HTN DM, hx migraines as per patient, now with 1 week of intermittent dizziness (mild, room spinning /vertigo), associated with getting up / standing, not currently having symptoms, associated with intermittent headache lasting hours, patient attributes headache to blood pressure changes (max blood pressure 162/112 measured at home, currently follows with cardiologist), currently not having headache, no LoC / falls / syncope / urinary incontinence / tongue bite / vomiting / diarrhea.

## 2023-05-26 NOTE — ED ADULT NURSE NOTE - NSFALLUNIVINTERV_ED_ALL_ED
Bed/Stretcher in lowest position, wheels locked, appropriate side rails in place/Call bell, personal items and telephone in reach/Instruct patient to call for assistance before getting out of bed/chair/stretcher/Non-slip footwear applied when patient is off stretcher/[place] to call system/Physically safe environment - no spills, clutter or unnecessary equipment/Purposeful proactive rounding/Room/bathroom lighting operational, light cord in reach

## 2023-05-26 NOTE — ED ADULT NURSE NOTE - DOES PATIENT HAVE ADVANCE DIRECTIVE
TWIN A: Baby boy born at 33.5 wks via primary unscheduled CS to a 30 y/o  blood type O+ mother for PTL and PEC. Di/Di spontaneous twin pregnancy. Pregnancy complicated by IUGR and transverse lie of Twin A. No significant maternal history. PNL nr/immune/-, GBS - on . BMZ -. ROM at delivery with clear fluids. Delayed cord clamping for 30 seconds. Baby emerged vigorous, crying, was w/d/s/s with APGARS of 8/9 for color. CPAP 5/30% initiated at 4 MOL and pulse ox remained stable. Infant transferred to NICU for prematurity. Consents circ. Maternal TMax 37.1 unknown TWIN A: Baby boy born at 33.5 wks via primary unscheduled CS to a 28 y/o  blood type O+ mother for PTL and PEC. Di/Di spontaneous twin pregnancy. Pregnancy complicated by preeclampsia, IUGR and transverse lie of Twin A. No significant maternal history. PNL nr/immune/-, GBS - on . BMZ -. ROM at delivery with clear fluids. Delayed cord clamping for 30 seconds. Baby emerged vigorous, crying, was w/d/s/s with APGARS of 8/9 for color. CPAP 5/30% initiated at 4 MOL and pulse ox remained stable. Infant transferred to NICU for prematurity. Consents circ. Maternal TMax 37.1      FEN: NPO, start early TPN. Glucose monitoring as per protocol.   ADWG:  ________ (G/kg/day / date); Raymond %: _______  (%/date) ; HC:     Respiratory: TTN. On bubble CPAP, adjust as necessary. Blood gases as needed.   CV: No current issues. Continue cardiorespiratory monitoring. Observe for signs of PDA, once PVR decreases.  Hem: At risk for hyperbiilrubinemia due to prematurity. Monitor for anemia and thrombocytopenia.  ID: Monitor for signs and symptoms of sepsis. Empiric ABx therapy. Continue ABx for 48 hrs pending BCx results, then reevaluate.  Neuro:  NDE PTD.   Thermal: Immature thermoregulation, requires incubator.   Ortho: Transverse presentation at birth. Screening hip US at 44-46 weeks of PMA.  Social: parents fully updated  Labs/Images/Studies: CBC, BCx, ABG, Type and Marley, CXR now

## 2023-05-26 NOTE — ED PROVIDER NOTE - CLINICAL SUMMARY MEDICAL DECISION MAKING FREE TEXT BOX
# headache, dizziness: given intermittent symptoms and currently without dizziness or headache, possible diagnosis of BPPV as cause of spinning sensation.  No cerebellar deficit on exam concerning for central cause of dizziness, concern for cerebellar mass/stroke/bleed below threshold for CTH/CTA/stroke consult.  No arrythmia on EKG to explain dizziness.  Plan r/o gross metabolic abnormality ?anemia ?hypoglycemia ?thalia with uremia ?hypernatremia to explain dizziness.    Discussed importance of following up with outpatient providers including cardiologist and PCP with patient and family member.

## 2023-06-27 ENCOUNTER — NON-APPOINTMENT (OUTPATIENT)
Age: 63
End: 2023-06-27

## 2023-06-29 ENCOUNTER — NON-APPOINTMENT (OUTPATIENT)
Age: 63
End: 2023-06-29

## 2023-07-27 ENCOUNTER — APPOINTMENT (OUTPATIENT)
Dept: ENDOCRINOLOGY | Facility: CLINIC | Age: 63
End: 2023-07-27
Payer: COMMERCIAL

## 2023-07-27 VITALS
BODY MASS INDEX: 26.16 KG/M2 | WEIGHT: 143 LBS | SYSTOLIC BLOOD PRESSURE: 112 MMHG | DIASTOLIC BLOOD PRESSURE: 78 MMHG | HEART RATE: 81 BPM

## 2023-07-27 LAB — GLUCOSE BLDC GLUCOMTR-MCNC: 195

## 2023-07-27 PROCEDURE — 82962 GLUCOSE BLOOD TEST: CPT

## 2023-07-27 PROCEDURE — 99214 OFFICE O/P EST MOD 30 MIN: CPT | Mod: 25

## 2023-07-27 RX ORDER — SEMAGLUTIDE 0.68 MG/ML
2 INJECTION, SOLUTION SUBCUTANEOUS
Qty: 3 | Refills: 1 | Status: DISCONTINUED | COMMUNITY
Start: 2023-05-11 | End: 2023-07-27

## 2023-07-28 LAB
ALBUMIN SERPL ELPH-MCNC: 4.8 G/DL
ALP BLD-CCNC: 109 U/L
ALT SERPL-CCNC: 34 U/L
ANION GAP SERPL CALC-SCNC: 15 MMOL/L
AST SERPL-CCNC: 23 U/L
BILIRUB SERPL-MCNC: 0.2 MG/DL
BUN SERPL-MCNC: 31 MG/DL
CALCIUM SERPL-MCNC: 10.5 MG/DL
CHLORIDE SERPL-SCNC: 101 MMOL/L
CHOLEST SERPL-MCNC: 122 MG/DL
CO2 SERPL-SCNC: 23 MMOL/L
CREAT SERPL-MCNC: 2.2 MG/DL
EGFR: 24 ML/MIN/1.73M2
ESTIMATED AVERAGE GLUCOSE: 163 MG/DL
FERRITIN SERPL-MCNC: 79 NG/ML
FOLATE SERPL-MCNC: 10.7 NG/ML
GLUCOSE SERPL-MCNC: 136 MG/DL
HBA1C MFR BLD HPLC: 7.3 %
HDLC SERPL-MCNC: 53 MG/DL
IRON SATN MFR SERPL: 21 %
IRON SERPL-MCNC: 76 UG/DL
LDLC SERPL CALC-MCNC: 48 MG/DL
NONHDLC SERPL-MCNC: 69 MG/DL
POTASSIUM SERPL-SCNC: 4.6 MMOL/L
PROT SERPL-MCNC: 8 G/DL
SODIUM SERPL-SCNC: 140 MMOL/L
TIBC SERPL-MCNC: 363 UG/DL
TRIGL SERPL-MCNC: 119 MG/DL
TSH SERPL-ACNC: 1.44 UIU/ML
UIBC SERPL-MCNC: 287 UG/DL
VIT B12 SERPL-MCNC: 859 PG/ML

## 2023-07-28 RX ORDER — TIRZEPATIDE 2.5 MG/.5ML
2.5 INJECTION, SOLUTION SUBCUTANEOUS
Qty: 1 | Refills: 0 | Status: COMPLETED | COMMUNITY
Start: 2023-07-28 | End: 2023-08-27

## 2023-07-28 RX ORDER — ORAL SEMAGLUTIDE 14 MG/1
14 TABLET ORAL
Qty: 30 | Refills: 5 | Status: DISCONTINUED | COMMUNITY
Start: 2023-01-23 | End: 2023-07-28

## 2023-07-28 NOTE — ASSESSMENT
[FreeTextEntry1] : Type 2 diabetes mellitus/elevated body mass index. HbA1c 7.1% in April 2023 and glucose 195 mg/dL today. Stage 3 chronic kidney disease. I congratulated her on her improving glycemic control. We discussed the cardiovascular and microvascular complications of uncontrolled diabetes. We have discussed the importance of diet and exercise and lifestyle modification for glycemic control. We discussed pharmacologic options for glycemic control. We will further discuss pharmacologic therapy pending laboratory results below.\par Check complete blood count, comprehensive metabolic panel, HbA1c, lipid panel, TSH\par Continue Jardiance 25 mg daily\par Continue Rybelsus 14 mg daily for now; she will need refills pending results\par Check blood sugars daily, fasting or postprandial; reviewed glycemic goals\par She is on a blood pressure regimen; blood pressure around goal\par She is on a statin for cholesterol; monitor lipid panel\par Nephropathy screening: Treated with an angiotenin receptor blocker and followed by nephrology\par Last ophthalmology appointment: May 2023; every three months\par Last podiatry appointment: August 2022; annual\par Last dental appointment: Over six months; advised appointment\par \par Return to see me in 3 months. Patient advised to call earlier with significant hypo- or hyperglycemia. \par \par CC:\par Dr. Julian Woods, Fax [phone]\par Dr. Catrachita Gil, Fax [phone]

## 2023-07-28 NOTE — DATA REVIEWED
[FreeTextEntry1] : Laboratories (March 1, 2023) reviewed and significant for: \par Mean cell volume 78.2 fL (normal: 80.0-100.0), otherwise unremarkable complete blood count\par BUN/creatinine 26/1.78 mg/dL (eGFR 31 mL/min)\par \par Laboratories (September 29, 2022) reviewed and significant for: \par BUN/creatinine 22/1.62 mg/dL (eGFR 35 mL/min)\par HbA1c 7.1%

## 2023-07-28 NOTE — PHYSICAL EXAM
[Alert] : alert [Healthy Appearance] : healthy appearance [No Acute Distress] : no acute distress [Normal Sclera/Conjunctiva] : normal sclera/conjunctiva [Normal Hearing] : hearing was normal [Clear to Auscultation] : lungs were clear to auscultation bilaterally [No Stigmata of Cushings Syndrome] : no stigmata of Cushings Syndrome [Normal Gait] : normal gait [Acanthosis Nigricans] : acanthosis nigricans present [Normal Insight/Judgement] : insight and judgment were intact [Right foot was examined, including] : right foot ~C was examined, including visual inspection with sensory and pulse exams [Left foot was examined, including] : left foot ~C was examined, including visual inspection with sensory and pulse exams [Normal] : normal [2+] : 2+ in the dorsalis pedis [Kyphosis] : no kyphosis present [Diminished Throughout Both Feet] : normal tactile sensation with monofilament testing throughout both feet [de-identified] : no moon facies, no supraclavicular fat pads

## 2023-07-28 NOTE — ADDENDUM
[FreeTextEntry1] : Recent laboratory results as below; discussed with Ms. Phoenix. HbA1c 7.3%. We will continue Jardiance 25 mg daily, stop Rybelsus and start a trial of Mounjaro 2.5 mg weekly for four weeks, then 5 mg weekly. eGFR 24 mL/min and recommended follow-up with nephrology. Lipid panel around goal. TSH and other test results within the normal range. 7/28/23

## 2023-07-28 NOTE — HISTORY OF PRESENT ILLNESS
[FreeTextEntry1] : Ms. Phoenix is a 63 year-old woman with a history of type 2 diabetes mellitus, hypertension, hyperlipidemia, stage 3 chronic kidney disease presenting for follow-up of type 2 diabetes mellitus. I saw her for an initial visit in December 2022 and last in April 2023.\par \par Type 2 diabetes mellitus. HbA1c 7.1% in April 2023 and glucose 195 mg/dL today; HbA1c 7.4% in December 2022. Stage 3 chronic kidney disease.\par She was diagnosed with diabetes over 10 years ago. No hospitalizations for hypo- or hyperglycemia.\par At her initial visit she was taking metformin 1000 mg daily, Jardiance 25 mg daily, and Januvia 25 mg daily; no recent changes. She was previously on a sulfonylurea. \par In December 2022 we continued metformin 1000 mg daily, continued Jardiance 25 mg daily, stopped Januvia and started Trulicity 0.75 mg weekly. She had bruising with Trulicity and we switched to Rybelsus up to 7 mg daily. Metformin was discontinued by her nephrologist. \par In April 2023 we continued Jardiance 25 mg daily, stopped Rybelsus and started Ozempic up to 0.5 mg weekly. She stopped Ozempic due to side effects. She is currently taking Jardiance 25 mg daily and Rybelsus 14 mg daily.\par She is checking blood sugars daily\par She is on a blood pressure regimen\par She is on a statin for cholesterol\par Nephropathy screening: Treated with an angiotenin receptor blocker and followed by nephrology\par Last ophthalmology appointment: May 2023; every three months\par Last podiatry appointment: August 2022; annual\par Last dental appointment: Over six months\par \par Interim History \par She is currently taking Jardiance 25 mg daily and Rybelsus 14 mg daily.\par Fasting glucose values usually 110-130s mg/dL; can be higher depending on food intake the night before.\par She has made extensive lifestyle modifications. \par Weight is down 4 pounds since last visit. She has had occasional burning/tingling in her feet. No chest pain or shortness of breath.\par Medical and surgical history, medications, allergies, social and family history reviewed and updated as needed.

## 2023-10-06 ENCOUNTER — EMERGENCY (EMERGENCY)
Facility: HOSPITAL | Age: 63
LOS: 1 days | Discharge: HOME | End: 2023-10-06
Attending: EMERGENCY MEDICINE | Admitting: EMERGENCY MEDICINE
Payer: COMMERCIAL

## 2023-10-06 VITALS
RESPIRATION RATE: 17 BRPM | SYSTOLIC BLOOD PRESSURE: 106 MMHG | DIASTOLIC BLOOD PRESSURE: 73 MMHG | HEART RATE: 78 BPM | OXYGEN SATURATION: 98 % | TEMPERATURE: 98 F

## 2023-10-06 VITALS
SYSTOLIC BLOOD PRESSURE: 157 MMHG | TEMPERATURE: 98 F | HEIGHT: 62 IN | RESPIRATION RATE: 18 BRPM | OXYGEN SATURATION: 98 % | HEART RATE: 82 BPM | WEIGHT: 143.96 LBS | DIASTOLIC BLOOD PRESSURE: 99 MMHG

## 2023-10-06 DIAGNOSIS — R00.2 PALPITATIONS: ICD-10-CM

## 2023-10-06 DIAGNOSIS — I10 ESSENTIAL (PRIMARY) HYPERTENSION: ICD-10-CM

## 2023-10-06 DIAGNOSIS — R42 DIZZINESS AND GIDDINESS: ICD-10-CM

## 2023-10-06 DIAGNOSIS — Z90.710 ACQUIRED ABSENCE OF BOTH CERVIX AND UTERUS: ICD-10-CM

## 2023-10-06 DIAGNOSIS — R51.9 HEADACHE, UNSPECIFIED: ICD-10-CM

## 2023-10-06 DIAGNOSIS — Z79.84 LONG TERM (CURRENT) USE OF ORAL HYPOGLYCEMIC DRUGS: ICD-10-CM

## 2023-10-06 DIAGNOSIS — E11.9 TYPE 2 DIABETES MELLITUS WITHOUT COMPLICATIONS: ICD-10-CM

## 2023-10-06 DIAGNOSIS — Z90.710 ACQUIRED ABSENCE OF BOTH CERVIX AND UTERUS: Chronic | ICD-10-CM

## 2023-10-06 LAB
ANION GAP SERPL CALC-SCNC: 11 MMOL/L — SIGNIFICANT CHANGE UP (ref 5–17)
APPEARANCE UR: CLEAR — SIGNIFICANT CHANGE UP
BASOPHILS # BLD AUTO: 0.03 K/UL — SIGNIFICANT CHANGE UP (ref 0–0.2)
BASOPHILS NFR BLD AUTO: 0.6 % — SIGNIFICANT CHANGE UP (ref 0–2)
BILIRUB UR-MCNC: NEGATIVE — SIGNIFICANT CHANGE UP
BUN SERPL-MCNC: 24 MG/DL — HIGH (ref 7–23)
CALCIUM SERPL-MCNC: 10.3 MG/DL — SIGNIFICANT CHANGE UP (ref 8.4–10.5)
CHLORIDE SERPL-SCNC: 105 MMOL/L — SIGNIFICANT CHANGE UP (ref 96–108)
CO2 SERPL-SCNC: 23 MMOL/L — SIGNIFICANT CHANGE UP (ref 22–31)
COLOR SPEC: YELLOW — SIGNIFICANT CHANGE UP
CREAT SERPL-MCNC: 1.57 MG/DL — HIGH (ref 0.5–1.3)
DIFF PNL FLD: NEGATIVE — SIGNIFICANT CHANGE UP
EGFR: 37 ML/MIN/1.73M2 — LOW
EOSINOPHIL # BLD AUTO: 0.04 K/UL — SIGNIFICANT CHANGE UP (ref 0–0.5)
EOSINOPHIL NFR BLD AUTO: 0.8 % — SIGNIFICANT CHANGE UP (ref 0–6)
GLUCOSE SERPL-MCNC: 112 MG/DL — HIGH (ref 70–99)
GLUCOSE UR QL: >=1000
HCT VFR BLD CALC: 41 % — SIGNIFICANT CHANGE UP (ref 34.5–45)
HGB BLD-MCNC: 13.5 G/DL — SIGNIFICANT CHANGE UP (ref 11.5–15.5)
IMM GRANULOCYTES NFR BLD AUTO: 0.2 % — SIGNIFICANT CHANGE UP (ref 0–0.9)
KETONES UR-MCNC: NEGATIVE — SIGNIFICANT CHANGE UP
LEUKOCYTE ESTERASE UR-ACNC: NEGATIVE — SIGNIFICANT CHANGE UP
LIDOCAIN IGE QN: 96 U/L — HIGH (ref 7–60)
LYMPHOCYTES # BLD AUTO: 2.07 K/UL — SIGNIFICANT CHANGE UP (ref 1–3.3)
LYMPHOCYTES # BLD AUTO: 42 % — SIGNIFICANT CHANGE UP (ref 13–44)
MAGNESIUM SERPL-MCNC: 2 MG/DL — SIGNIFICANT CHANGE UP (ref 1.6–2.6)
MCHC RBC-ENTMCNC: 25.7 PG — LOW (ref 27–34)
MCHC RBC-ENTMCNC: 32.9 GM/DL — SIGNIFICANT CHANGE UP (ref 32–36)
MCV RBC AUTO: 77.9 FL — LOW (ref 80–100)
MONOCYTES # BLD AUTO: 0.3 K/UL — SIGNIFICANT CHANGE UP (ref 0–0.9)
MONOCYTES NFR BLD AUTO: 6.1 % — SIGNIFICANT CHANGE UP (ref 2–14)
NEUTROPHILS # BLD AUTO: 2.48 K/UL — SIGNIFICANT CHANGE UP (ref 1.8–7.4)
NEUTROPHILS NFR BLD AUTO: 50.3 % — SIGNIFICANT CHANGE UP (ref 43–77)
NITRITE UR-MCNC: NEGATIVE — SIGNIFICANT CHANGE UP
NRBC # BLD: 0 /100 WBCS — SIGNIFICANT CHANGE UP (ref 0–0)
PH UR: 5.5 — SIGNIFICANT CHANGE UP (ref 5–8)
PLATELET # BLD AUTO: 219 K/UL — SIGNIFICANT CHANGE UP (ref 150–400)
POTASSIUM SERPL-MCNC: 4.4 MMOL/L — SIGNIFICANT CHANGE UP (ref 3.5–5.3)
POTASSIUM SERPL-SCNC: 4.4 MMOL/L — SIGNIFICANT CHANGE UP (ref 3.5–5.3)
PROT UR-MCNC: NEGATIVE MG/DL — SIGNIFICANT CHANGE UP
RBC # BLD: 5.26 M/UL — HIGH (ref 3.8–5.2)
RBC # FLD: 14.6 % — HIGH (ref 10.3–14.5)
SODIUM SERPL-SCNC: 139 MMOL/L — SIGNIFICANT CHANGE UP (ref 135–145)
SP GR SPEC: 1.02 — SIGNIFICANT CHANGE UP (ref 1–1.03)
TROPONIN T, HIGH SENSITIVITY RESULT: 7 NG/L — SIGNIFICANT CHANGE UP (ref 0–51)
UROBILINOGEN FLD QL: 0.2 E.U./DL — SIGNIFICANT CHANGE UP
WBC # BLD: 4.93 K/UL — SIGNIFICANT CHANGE UP (ref 3.8–10.5)
WBC # FLD AUTO: 4.93 K/UL — SIGNIFICANT CHANGE UP (ref 3.8–10.5)

## 2023-10-06 PROCEDURE — 99285 EMERGENCY DEPT VISIT HI MDM: CPT

## 2023-10-06 PROCEDURE — 85025 COMPLETE CBC W/AUTO DIFF WBC: CPT

## 2023-10-06 PROCEDURE — G1004: CPT

## 2023-10-06 PROCEDURE — 83690 ASSAY OF LIPASE: CPT

## 2023-10-06 PROCEDURE — 96374 THER/PROPH/DIAG INJ IV PUSH: CPT

## 2023-10-06 PROCEDURE — 70450 CT HEAD/BRAIN W/O DYE: CPT | Mod: MG

## 2023-10-06 PROCEDURE — 83735 ASSAY OF MAGNESIUM: CPT

## 2023-10-06 PROCEDURE — 96375 TX/PRO/DX INJ NEW DRUG ADDON: CPT

## 2023-10-06 PROCEDURE — 84484 ASSAY OF TROPONIN QUANT: CPT

## 2023-10-06 PROCEDURE — 36415 COLL VENOUS BLD VENIPUNCTURE: CPT

## 2023-10-06 PROCEDURE — 99284 EMERGENCY DEPT VISIT MOD MDM: CPT | Mod: 25

## 2023-10-06 PROCEDURE — 80048 BASIC METABOLIC PNL TOTAL CA: CPT

## 2023-10-06 PROCEDURE — 81003 URINALYSIS AUTO W/O SCOPE: CPT

## 2023-10-06 PROCEDURE — 70450 CT HEAD/BRAIN W/O DYE: CPT | Mod: 26,MG

## 2023-10-06 RX ORDER — SODIUM CHLORIDE 9 MG/ML
1000 INJECTION INTRAMUSCULAR; INTRAVENOUS; SUBCUTANEOUS ONCE
Refills: 0 | Status: COMPLETED | OUTPATIENT
Start: 2023-10-06 | End: 2023-10-06

## 2023-10-06 RX ORDER — ACETAMINOPHEN 500 MG
1000 TABLET ORAL ONCE
Refills: 0 | Status: COMPLETED | OUTPATIENT
Start: 2023-10-06 | End: 2023-10-06

## 2023-10-06 RX ORDER — METOCLOPRAMIDE HCL 10 MG
10 TABLET ORAL ONCE
Refills: 0 | Status: COMPLETED | OUTPATIENT
Start: 2023-10-06 | End: 2023-10-06

## 2023-10-06 RX ADMIN — Medication 400 MILLIGRAM(S): at 02:14

## 2023-10-06 RX ADMIN — Medication 104 MILLIGRAM(S): at 02:31

## 2023-10-06 RX ADMIN — SODIUM CHLORIDE 1000 MILLILITER(S): 9 INJECTION INTRAMUSCULAR; INTRAVENOUS; SUBCUTANEOUS at 03:07

## 2023-10-06 NOTE — ED PROVIDER NOTE - ATTENDING APP SHARED VISIT CONTRIBUTION OF CARE
63F hx htn, dm, c/o elevated BP. states ongoing for past 4 days. also with intermittent headache. no vomiting. some dizziness today along with palpitations. no chest pain. no SOB.   gen- nad  heent- ncat  cv -rrr  lungs -ctab  ext -wwp  neuro -aox3, steady gait, barnhart  negative ct head, no tachycardia throughout ED stay, BP WNL

## 2023-10-06 NOTE — ED PROVIDER NOTE - OBJECTIVE STATEMENT
54 yo female with h/o HTN, DM2, in the Er c/o elevated BP for the past 4 days despite her taking all medications as prescribed.   pt also mentioned intermittent HA, on and off x 4 days. Pt states initially tylenol helped with headache but yesterday headache did not go away. Pt also mentioned palpitations and anxiety feelings in her chest, c/o lightheadedness, dizziness today. No known head injury, no fever, chills, no CP, SOB, vision changes. Pt states she has been checking her BP every 3-4 hours since yesterday and its not going down.

## 2023-10-06 NOTE — ED PROVIDER NOTE - NSFOLLOWUPINSTRUCTIONS_ED_ALL_ED_FT
Please continue all your medications as prescribed and follow up with your PMD for re-evaluation next weeks.       General Headache Without Cause  A headache is pain or discomfort you feel around the head or neck area. There are many causes and types of headaches. In some cases, the cause may not be found.    Follow these instructions at home:  Watch your condition for any changes. Let your doctor know about them. Take these steps to help with your condition:    Managing pain    A bag of ice on a towel on the skin.   A heating pad for use on the painful area.   Take over-the-counter and prescription medicines only as told by your doctor. This includes medicines for pain that are taken by mouth or put on the skin.  Lie down in a dark, quiet room when you have a headache.  If told, put ice on your head and neck area:  Put ice in a plastic bag.  Place a towel between your skin and the bag.  Leave the ice on for 20 minutes, 2–3 times per day.  Take off the ice if your skin turns bright red. This is very important. If you cannot feel pain, heat, or cold, you have a greater risk of damage to the area.  If told, put heat on the affected area. Use the heat source that your doctor recommends, such as a moist heat pack or a heating pad.  Place a towel between your skin and the heat source.  Leave the heat on for 20–30 minutes.  Take off the heat if your skin turns bright red. This is very important. If you cannot feel pain, heat, or cold, you have a greater risk of getting burned.  Keep lights dim if bright lights bother you or make your headaches worse.  Eating and drinking    Eat meals on a regular schedule.  If you drink alcohol:  Limit how much you have to:  0–1 drink a day for women who are not pregnant.  0–2 drinks a day for men.  Know how much alcohol is in a drink. In the [place] one drink equals one 12 oz bottle of beer (355 mL), one 5 oz glass of wine (148 mL), or one 1½ oz glass of hard liquor (44 mL).  Stop drinking caffeine, or drink less caffeine.  General instructions    A person writing in a journal.   Keep a journal to find out if certain things bring on headaches. For example, write down:  What you eat and drink.  How much sleep you get.  Any change to your diet or medicines.  Get a massage or try other ways to relax.  Limit stress.  Sit up straight. Do not tighten (tense) your muscles.  Do not smoke or use any products that contain nicotine or tobacco. If you need help quitting, ask your doctor.  Exercise regularly as told by your doctor.  Get enough sleep. This often means 7–9 hours of sleep each night.  Keep all follow-up visits. This is important.  Contact a doctor if:  Medicine does not help your symptoms.  You have a headache that feels different than the other headaches.  You feel like you may vomit (nauseous) or you vomit.  You have a fever.  Get help right away if:  Your headache:  Gets very bad quickly.  Gets worse after a lot of physical activity.  You have any of these symptoms:  You continue to vomit.  A stiff neck.  Trouble seeing.  Your eye or ear hurts.  Trouble speaking.  Weak muscles or you lose muscle control.  You lose your balance or have trouble walking.  You feel like you will pass out (faint) or you pass out.  You are mixed up (confused).  You have a seizure.  These symptoms may be an emergency. Get help right away. Call your local emergency services (911 in the [place]  Do not wait to see if the symptoms will go away.  Do not drive yourself to the hospital.  Summary  A headache is pain or discomfort that is felt around the head or neck area.  There are many causes and types of headaches. In some cases, the cause may not be found.  Keep a journal to help find out what causes your headaches. Watch your condition for any changes. Let your doctor know about them.  Contact a doctor if you have a headache that is different from usual, or if medicine does not help your headache.  Get help right away if your headache gets very bad, you throw up, you have trouble seeing, you lose your balance, or you have a seizure.  This information is not intended to replace advice given to you by your health care provider. Make sure you discuss any questions you have with your health care provider. Please continue all your medications as prescribed and follow up with your PMD for re-evaluation next weeks.       General Headache Without Cause  A headache is pain or discomfort you feel around the head or neck area. There are many causes and types of headaches. In some cases, the cause may not be found.    Follow these instructions at home:  Watch your condition for any changes. Let your doctor know about them. Take these steps to help with your condition:    Managing pain    A bag of ice on a towel on the skin.   A heating pad for use on the painful area.   Take over-the-counter and prescription medicines only as told by your doctor. This includes medicines for pain that are taken by mouth or put on the skin.  Lie down in a dark, quiet room when you have a headache.  If told, put ice on your head and neck area:  Put ice in a plastic bag.  Place a towel between your skin and the bag.  Leave the ice on for 20 minutes, 2–3 times per day.  Take off the ice if your skin turns bright red. This is very important. If you cannot feel pain, heat, or cold, you have a greater risk of damage to the area.  If told, put heat on the affected area. Use the heat source that your doctor recommends, such as a moist heat pack or a heating pad.  Place a towel between your skin and the heat source.  Leave the heat on for 20–30 minutes.  Take off the heat if your skin turns bright red. This is very important. If you cannot feel pain, heat, or cold, you have a greater risk of getting burned.  Keep lights dim if bright lights bother you or make your headaches worse.  Eating and drinking    Eat meals on a regular schedule.  If you drink alcohol:  Limit how much you have to:  0–1 drink a day for women who are not pregnant.  0–2 drinks a day for men.  Know how much alcohol is in a drink. In the [place] one drink equals one 12 oz bottle of beer (355 mL), one 5 oz glass of wine (148 mL), or one 1½ oz glass of hard liquor (44 mL).  Stop drinking caffeine, or drink less caffeine.  General instructions    A person writing in a journal.   Keep a journal to find out if certain things bring on headaches. For example, write down:  What you eat and drink.  How much sleep you get.  Any change to your diet or medicines.  Get a massage or try other ways to relax.  Limit stress.  Sit up straight. Do not tighten (tense) your muscles.  Do not smoke or use any products that contain nicotine or tobacco. If you need help quitting, ask your doctor.  Exercise regularly as told by your doctor.  Get enough sleep. This often means 7–9 hours of sleep each night.  Keep all follow-up visits. This is important.  Contact a doctor if:  Medicine does not help your symptoms.  You have a headache that feels different than the other headaches.  You feel like you may vomit (nauseous) or you vomit.  You have a fever.  Get help right away if:  Your headache:  Gets very bad quickly.  Gets worse after a lot of physical activity.  You have any of these symptoms:  You continue to vomit.  A stiff neck.  Trouble seeing.  Your eye or ear hurts.  Trouble speaking.  Weak muscles or you lose muscle control.  You lose your balance or have trouble walking.  You feel like you will pass out (faint) or you pass out.  You are mixed up (confused).  You have a seizure.  These symptoms may be an emergency. Get help right away. Call your local emergency services (911 in the [place]  Do not wait to see if the symptoms will go away.  Do not drive yourself to the hospital.  Summary  A headache is pain or discomfort that is felt around the head or neck area.  There are many causes and types of headaches. In some cases, the cause may not be found.  Keep a journal to help find out what causes your headaches. Watch your condition for any changes. Let your doctor know about them.  Contact a doctor if you have a headache that is different from usual, or if medicine does not help your headache.  Get help right away if your headache gets very bad, you throw up, you have trouble seeing, you lose your balance, or you have a seizure.  This information is not intended to replace advice given to you by your health care provider. Make sure you discuss any questions you have with your health care provider.      Palpitations  Palpitations are feelings that your heartbeat is irregular or is faster than normal. It may feel like your heart is fluttering or skipping a beat. Palpitations may be caused by many things, including smoking, caffeine, alcohol, stress, and certain medicines or drugs. Most causes of palpitations are not serious.    However, some palpitations can be a sign of a serious problem. Further tests and a thorough medical history will be done to find the cause of your palpitations. Your provider may order tests such as an ECG, labs, an echocardiogram, or an ambulatory continuous ECG monitor.    Follow these instructions at home:  Pay attention to any changes in your symptoms. Let your health care provider know about them. Take these actions to help manage your symptoms:    Eating and drinking    Follow instructions from your health care provider about eating or drinking restrictions. You may need to avoid foods and drinks that may cause palpitations. These may include:  Caffeinated coffee, tea, soft drinks, and energy drinks.  Chocolate.  Alcohol.  Diet pills.  Lifestyle    A person sitting on the floor doing yoga.  A sign telling the reader not to smoke.  Take steps to reduce your stress and anxiety. Things that can help you relax include:  Yoga.  Mind-body activities, such as deep breathing, meditation, or using words and images to create positive thoughts (guided imagery).  Physical activity, such as swimming, jogging, or walking. Tell your health care provider if your palpitations increase with activity. If you have chest pain or shortness of breath with activity, do not continue the activity until you are seen by your health care provider.  Biofeedback. This is a method that helps you learn to use your mind to control things in your body, such as your heartbeat.  Get plenty of rest and sleep. Keep a regular bed time.  Do not use drugs, including cocaine or ecstasy. Do not use marijuana.  Do not use any products that contain nicotine or tobacco. These products include cigarettes, chewing tobacco, and vaping devices, such as e-cigarettes. If you need help quitting, ask your health care provider.  General instructions    Take over-the-counter and prescription medicines only as told by your health care provider.  Keep all follow-up visits. This is important. These may include visits for further testing if palpitations do not go away or get worse.  Contact a health care provider if:  You continue to have a fast or irregular heartbeat for a long period of time.  You notice that your palpitations occur more often.  Get help right away if:  You have chest pain or shortness of breath.  You have a severe headache.  You feel dizzy or you faint.  These symptoms may represent a serious problem that is an emergency. Do not wait to see if the symptoms will go away. Get medical help right away. Call your local emergency services (911 in the [place] Do not drive yourself to the hospital.    Summary  Palpitations are feelings that your heartbeat is irregular or is faster than normal. It may feel like your heart is fluttering or skipping a beat.  Palpitations may be caused by many things, including smoking, caffeine, alcohol, stress, certain medicines, and drugs.  Further tests and a thorough medical history may be done to find the cause of your palpitations.  Get help right away if you faint or have chest pain, shortness of breath, severe headache, or dizziness.  This information is not intended to replace advice given to you by your health care provider. Make sure you discuss any questions you have with your health care provider.

## 2023-10-06 NOTE — ED ADULT NURSE NOTE - OBJECTIVE STATEMENT
64 yo F PMHx HTN, DM presents to the ED co ha, palpitations, dizziness, high BP x 4 days. Pt awake and alert, AOx4. Pt reports she has had a dull intermittent ha for the last 4 days. Pt reports her BP has continued to increase over the last four days despite taking her BP medications. Pt reports she feels lightheaded with intermittent dizziness. Pt reports she feels generally weak. Pt reports her face feels "tingly." No facial droop noted. Pt reports nausea. Pt denies vomiting / diarrhea, f/c, CP, SOB, numbness / tingling in her extremities, vision changes.

## 2023-10-06 NOTE — ED PROVIDER NOTE - PHYSICAL EXAMINATION
PHYSICAL EXAM:    Constitutional: well appearing, NAD  HEENT: NC/AT, PERRL, EOMI, anicteric sclera, no nasal discharge; uvula midline,   Neck: supple; no JVD or thyromegaly  Respiratory: unlabored breathing, CTA B/L; no W/Rhonchi/Crackles, no retractions or use of accessory muscles   Cardiac:  RRR; no M/R/G;   Gastrointestinal: NT ND;  negative CVA tenderness, +BSx4  Back: spine midline, no bony tenderness or step-offs; no CVAT B/L  Extremities: WWP, no clubbing or cyanosis; no peripheral edema  Musculoskeletal: NROM x4; no joint swelling, tenderness or erythema  Vascular: 2+ radial, DP/PT pulses B/L  Dermatologic: skin warm, dry and intact; no rashes, wounds, or scars  Neurologic: AAOx3; CNII-XII grossly intact; no focal deficits  Psychiatric: affect and characteristics of appearance, verbalizations, behaviors are appropriate, denies SI/HI/AH/VH

## 2023-10-06 NOTE — ED PROVIDER NOTE - CLINICAL SUMMARY MEDICAL DECISION MAKING FREE TEXT BOX
54 yo female with h/o HTN, DM2, in the Er c/o elevated BP for the past 4 days despite her taking all medications as prescribed.   pt also mentioned intermittent HA, on and off x 4 days. Pt states initially tylenol helped with headache but yesterday headache did not go away. Pt also mentioned palpitations and anxiety feelings in her chest, c/o lightheadedness, dizziness today. No known head injury, no fever, chills, no CP, SOB, vision changes. Pt states she has been checking her BP every 3-4 hours since yesterday and its not going down.  Pt well appearing and in NAD. exam- unremarkable, including neuro: alert and oriented x 3, face symmetric and speech fluent. Strength 5/5 x 4 ext and symmetric, nml gross motor movement, nml gait. No focal deficits noted.  Plan : labs, IVF, Reglan, Tylenol IV for HA. head CT ( pt is anxious and insist on imaging), re-evaluate.

## 2023-10-06 NOTE — ED PROVIDER NOTE - PATIENT PORTAL LINK FT
You can access the FollowMyHealth Patient Portal offered by [facility] by registering at the following website: [email or web address]. By joining [employer]’s FollowMyHealth portal, you will also be able to view your health information using other applications (apps) compatible with our system.

## 2023-10-13 ENCOUNTER — APPOINTMENT (OUTPATIENT)
Dept: ENDOCRINOLOGY | Facility: CLINIC | Age: 63
End: 2023-10-13
Payer: COMMERCIAL

## 2023-10-13 VITALS
HEIGHT: 62 IN | HEART RATE: 76 BPM | WEIGHT: 144 LBS | SYSTOLIC BLOOD PRESSURE: 127 MMHG | DIASTOLIC BLOOD PRESSURE: 85 MMHG | BODY MASS INDEX: 26.5 KG/M2

## 2023-10-13 LAB
GLUCOSE BLDC GLUCOMTR-MCNC: 131
HBA1C MFR BLD HPLC: 7

## 2023-10-13 PROCEDURE — 82962 GLUCOSE BLOOD TEST: CPT

## 2023-10-13 PROCEDURE — 83036 HEMOGLOBIN GLYCOSYLATED A1C: CPT | Mod: QW

## 2023-10-13 PROCEDURE — 99214 OFFICE O/P EST MOD 30 MIN: CPT | Mod: 25

## 2023-10-13 RX ORDER — TIRZEPATIDE 2.5 MG/.5ML
2.5 INJECTION, SOLUTION SUBCUTANEOUS
Qty: 3 | Refills: 1 | Status: DISCONTINUED | COMMUNITY
Start: 2023-07-28 | End: 2023-10-13

## 2023-10-19 ENCOUNTER — OUTPATIENT (OUTPATIENT)
Dept: OUTPATIENT SERVICES | Facility: HOSPITAL | Age: 63
LOS: 1 days | End: 2023-10-19
Payer: COMMERCIAL

## 2023-10-19 DIAGNOSIS — R94.31 ABNORMAL ELECTROCARDIOGRAM [ECG] [EKG]: ICD-10-CM

## 2023-10-19 DIAGNOSIS — Z90.710 ACQUIRED ABSENCE OF BOTH CERVIX AND UTERUS: Chronic | ICD-10-CM

## 2023-10-19 DIAGNOSIS — R03.0 ELEVATED BLOOD-PRESSURE READING, WITHOUT DIAGNOSIS OF HYPERTENSION: ICD-10-CM

## 2023-10-19 PROCEDURE — 93017 CV STRESS TEST TRACING ONLY: CPT

## 2023-10-19 PROCEDURE — 93018 CV STRESS TEST I&R ONLY: CPT

## 2023-10-19 PROCEDURE — 93016 CV STRESS TEST SUPVJ ONLY: CPT

## 2023-10-19 PROCEDURE — 78452 HT MUSCLE IMAGE SPECT MULT: CPT | Mod: 26

## 2023-10-19 PROCEDURE — 78452 HT MUSCLE IMAGE SPECT MULT: CPT

## 2023-10-19 PROCEDURE — A9500: CPT

## 2023-11-01 ENCOUNTER — APPOINTMENT (OUTPATIENT)
Dept: NEUROLOGY | Facility: CLINIC | Age: 63
End: 2023-11-01

## 2023-12-06 ENCOUNTER — NON-APPOINTMENT (OUTPATIENT)
Age: 63
End: 2023-12-06

## 2023-12-06 ENCOUNTER — APPOINTMENT (OUTPATIENT)
Dept: NEPHROLOGY | Facility: CLINIC | Age: 63
End: 2023-12-06
Payer: COMMERCIAL

## 2023-12-06 VITALS
HEIGHT: 62 IN | WEIGHT: 144 LBS | SYSTOLIC BLOOD PRESSURE: 145 MMHG | BODY MASS INDEX: 26.5 KG/M2 | DIASTOLIC BLOOD PRESSURE: 93 MMHG

## 2023-12-06 DIAGNOSIS — R00.2 PALPITATIONS: ICD-10-CM

## 2023-12-06 PROCEDURE — 99205 OFFICE O/P NEW HI 60 MIN: CPT

## 2023-12-07 LAB
ANION GAP SERPL CALC-SCNC: 11 MMOL/L
BUN SERPL-MCNC: 19 MG/DL
CALCIUM SERPL-MCNC: 10.2 MG/DL
CHLORIDE SERPL-SCNC: 104 MMOL/L
CO2 SERPL-SCNC: 27 MMOL/L
CREAT SERPL-MCNC: 1.55 MG/DL
CREAT SPEC-SCNC: 89 MG/DL
CYSTATIN C SERPL-MCNC: 1.2 MG/L
EGFR: 37 ML/MIN/1.73M2
GFR/BSA.PRED SERPLBLD CYS-BASED-ARV: 56 ML/MIN/1.73M2
GLUCOSE SERPL-MCNC: 97 MG/DL
MICROALBUMIN 24H UR DL<=1MG/L-MCNC: 4.1 MG/DL
MICROALBUMIN/CREAT 24H UR-RTO: 45 MG/G
POTASSIUM SERPL-SCNC: 4.7 MMOL/L
SODIUM SERPL-SCNC: 142 MMOL/L

## 2023-12-19 LAB
METANEPHRINE, PL: 36.9 PG/ML
NORMETANEPHRINE, PL: 171.1 PG/ML

## 2024-01-01 NOTE — ED ADULT NURSE NOTE - PT NEEDS ASSIST
Dr. Palacios no

## 2024-01-17 ENCOUNTER — APPOINTMENT (OUTPATIENT)
Dept: NEPHROLOGY | Facility: CLINIC | Age: 64
End: 2024-01-17
Payer: COMMERCIAL

## 2024-01-17 VITALS
HEIGHT: 62 IN | DIASTOLIC BLOOD PRESSURE: 85 MMHG | WEIGHT: 144 LBS | BODY MASS INDEX: 26.5 KG/M2 | SYSTOLIC BLOOD PRESSURE: 126 MMHG | HEART RATE: 80 BPM

## 2024-01-17 PROCEDURE — 99214 OFFICE O/P EST MOD 30 MIN: CPT

## 2024-01-17 PROCEDURE — G2211 COMPLEX E/M VISIT ADD ON: CPT

## 2024-01-17 NOTE — HISTORY OF PRESENT ILLNESS
[FreeTextEntry1] : 63-year-old woman referred by Dr. Martinez, her cardiologist at [facility], because of paroxysmal hypertension.  She also has type 2 diabetes and stage IIIb CKD.  Her creatinine was 1.34 back in  and she developed an episode of MAEGAN last July with a creatinine of 2.2.  She had an emergency room visit here in November with a BP of 156/105 and her creatinine was 1.57.  CT of the abdomen showed normal adrenal glands, a 2 mm left upper pole stone with no hydronephrosis and left renal cysts.  Her aorta was calcified and atherosclerotic but there was no aneurysm.  In the past, she was unhappy with taking multiple medicines and stopped several.  She has clonidine available for BP spikes.  Her current regimen has been amlodipine 10 mg daily, olmesartan 40 mg daily, and spironolactone 25 mg daily.  Her renal function is stable, with a creatinine of 1.55 in December, GFR 56 by Cystatin C, but only 37 by creatinine, K4.7, CO2 27, urine microalbumin 45, plasma metanephrines normal.  So there is no evidence of pheochromocytoma or primary aldosteronism.  But she develops BP spikes most mornings, requiring a single dose of clonidine -she experiences palpitations at times, for which she has a prescription for diltiazem 30 mg.  Her kidney stone has been silent thus far.  Her blood sugar control has improved, with A1c dropping from 7.1 down to 6.5.  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  We discussed traumatic events and she did have 1 last July, when her young niece  suddenly just after receiving her MD degree -she went to sleep 1 night and simply never woke up the next morning.  Ibeth lives alone and that is somewhat anxiety provoking for her.

## 2024-01-17 NOTE — CONSULT LETTER
[Dear  ___] : Dear  [unfilled], [Consult Letter:] : I had the pleasure of evaluating your patient, [unfilled]. [Please see my note below.] : Please see my note below. [Consult Closing:] : Thank you very much for allowing me to participate in the care of this patient.  If you have any questions, please do not hesitate to contact me. [Sincerely,] : Sincerely, [FreeTextEntry2] : Dr Martinez [DrKaley  ___] : Dr. CUI

## 2024-01-17 NOTE — ASSESSMENT
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  This syndrome often is preceded by early life trauma, but nothing in particular comes to mind other than the recent loss of her niece.  She is already on bisoprolol and I am adding doxazosin 1 mg daily for combined alpha and beta blockade.  She will take it at night since her episodes are typically upon awakening.  This is more than just the usual "morning surge", frequently noted in the ABPM literature.

## 2024-01-17 NOTE — PHYSICAL EXAM
[General Appearance - Alert] : alert [General Appearance - In No Acute Distress] : in no acute distress [Sclera] : the sclera and conjunctiva were normal [PERRL With Normal Accommodation] : pupils were equal in size, round, and reactive to light [Outer Ear] : the ears and nose were normal in appearance [Neck Appearance] : the appearance of the neck was normal [Neck Cervical Mass (___cm)] : no neck mass was observed [Jugular Venous Distention Increased] : there was no jugular-venous distention [Auscultation Breath Sounds / Voice Sounds] : lungs were clear to auscultation bilaterally [Heart Rate And Rhythm] : heart rate was normal and rhythm regular [Heart Sounds] : normal S1 and S2 [Heart Sounds Gallop] : no gallops [Murmurs] : no murmurs [Heart Sounds Pericardial Friction Rub] : no pericardial rub [Skin Color & Pigmentation] : normal skin color and pigmentation [Skin Turgor] : normal skin turgor [] : no rash [FreeTextEntry1] : No cafe au lait spots or neurofibromas. [No Focal Deficits] : no focal deficits [Oriented To Time, Place, And Person] : oriented to person, place, and time [Impaired Insight] : insight and judgment were intact [Affect] : the affect was normal

## 2024-01-18 RX ORDER — DOXAZOSIN 1 MG/1
1 TABLET ORAL
Qty: 30 | Refills: 4 | Status: ACTIVE | COMMUNITY
Start: 2024-01-18 | End: 1900-01-01

## 2024-02-01 ENCOUNTER — APPOINTMENT (OUTPATIENT)
Dept: NEPHROLOGY | Facility: CLINIC | Age: 64
End: 2024-02-01
Payer: COMMERCIAL

## 2024-02-01 VITALS
HEIGHT: 62 IN | WEIGHT: 144 LBS | DIASTOLIC BLOOD PRESSURE: 86 MMHG | SYSTOLIC BLOOD PRESSURE: 132 MMHG | BODY MASS INDEX: 26.5 KG/M2

## 2024-02-01 DIAGNOSIS — N20.0 CALCULUS OF KIDNEY: ICD-10-CM

## 2024-02-01 DIAGNOSIS — I10 ESSENTIAL (PRIMARY) HYPERTENSION: ICD-10-CM

## 2024-02-01 DIAGNOSIS — N18.32 CHRONIC KIDNEY DISEASE, STAGE 3B: ICD-10-CM

## 2024-02-01 DIAGNOSIS — N25.81 SECONDARY HYPERPARATHYROIDISM OF RENAL ORIGIN: ICD-10-CM

## 2024-02-01 DIAGNOSIS — R80.9 PROTEINURIA, UNSPECIFIED: ICD-10-CM

## 2024-02-01 PROCEDURE — 99443: CPT

## 2024-02-01 NOTE — HISTORY OF PRESENT ILLNESS
[Home] : at home, [unfilled] , at the time of the visit. [Medical Office: ([facility])___] : at the medical office located in  [Verbal consent obtained from patient] : the patient, [unfilled] [FreeTextEntry1] : Discussed with patient : You have chosen to receive care through the use of tele-media.  It enables healthcare providers at different locations to provide safe, effective, and convenient care through the use of technology.  Please note this is a billable encounter.  As with any healthcare service, there are risks associated with the use of tele-media, including  issues.  You understand that I cannot physically examine you and that you may need to come to the office to complete the assessment.   Patient agreed verbally and understands the risks and benefits of tele-media as explained.  All questions regarding tele-media encounters were answered.                                          63-year-old woman referred by , her cardiologist at [facility], because of paroxysmal hypertension and palpitations.  She has type 2 diabetes which is well-controlled by Dr. Leuvano.  She does have CKD, with microalbuminuria, presumably due to diabetic nephropathy.  Her creatinine in 2017 was 1.34, but she developed an episode of MAEGAN last July with a rising creatinine up to 2.2.  Her creatinine has since settled at about 1.55 as of December, 2023, with a GFR of 37 by creatinine and 56 by Cystatin C.  Her plasma metanephrines are normal, and there is no evidence of pheochromocytoma on CT scan, or of primary aldosteronism.  She develops morning BP spikes, often requiring a single dose of clonidine.  She also experiences palpitations, which are generally treated with as needed doses of diltiazem 30 mg .  She has a silent 2 mm left upper pole kidney stone with no hydronephrosis.  Her aorta is calcified and atherosclerotic but without aneurysm.  She has been unhappy with medications in the past and has often stopped them.  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  Her nephrologist told her that would be bad for the kidneys and not to take it and prescribed transdermal clonidine 0.1 weekly.  That did seem to help, with her blood pressure generally lower.  It was 132/86 when she saw her cardiologist yesterday afternoon.  It was 148/95 this morning, which is her pattern of morning spikes but not as high as she sometimes goes.  This improvement occurred despite a death in the family with increased stress.  We discussed the transdermal clonidine and I explained that if it should cause unacceptable side effects including rash.  We could consider switching to guanfacine which is longer acting than oral clonidine, and could be an equivalent.

## 2024-02-01 NOTE — CONSULT LETTER
[Dear  ___] : Dear  [unfilled], [Consult Letter:] : I had the pleasure of evaluating your patient, [unfilled]. [Please see my note below.] : Please see my note below. [Consult Closing:] : Thank you very much for allowing me to participate in the care of this patient.  If you have any questions, please do not hesitate to contact me. [Sincerely,] : Sincerely, [FreeTextEntry2] : Dr Martinez [FreeTextEntry3] : Sincerely,   Kyle Charles MD, FACP

## 2024-02-01 NOTE — ASSESSMENT
[FreeTextEntry1] : 63-year-old woman with paroxysmal hypertension in the setting of CKD and multiple antihypertensive meds.  She is faithfully taking amlodipine, olmesartan, and bisoprolol, and is now doing better with the addition of transdermal clonidine.  She did not try doxazosin -the plan there was to use combined alpha/beta blockade analogous to what has been reported by Dr. Brayan Resendiz at [place] [facility] in the setting of pseudo pheochromocytoma.  There is no evidence for pheochromocytoma and I do not see any further diagnostic testing that needs to be done in that regard.  She will follow-up in 4 months and see Dr. Martinez regularly.  Time spent 23 minutes

## 2024-03-26 ENCOUNTER — APPOINTMENT (OUTPATIENT)
Dept: ENDOCRINOLOGY | Facility: CLINIC | Age: 64
End: 2024-03-26
Payer: MEDICARE

## 2024-03-26 VITALS
WEIGHT: 143 LBS | BODY MASS INDEX: 26.16 KG/M2 | SYSTOLIC BLOOD PRESSURE: 148 MMHG | DIASTOLIC BLOOD PRESSURE: 89 MMHG | HEART RATE: 75 BPM

## 2024-03-26 DIAGNOSIS — E11.9 TYPE 2 DIABETES MELLITUS W/OUT COMPLICATIONS: ICD-10-CM

## 2024-03-26 DIAGNOSIS — E78.5 TYPE 2 DIABETES MELLITUS WITH OTHER SPECIFIED COMPLICATION: ICD-10-CM

## 2024-03-26 DIAGNOSIS — E11.69 TYPE 2 DIABETES MELLITUS WITH OTHER SPECIFIED COMPLICATION: ICD-10-CM

## 2024-03-26 LAB
GLUCOSE BLDC GLUCOMTR-MCNC: 121
HBA1C MFR BLD HPLC: 7.2

## 2024-03-26 PROCEDURE — 99214 OFFICE O/P EST MOD 30 MIN: CPT | Mod: 25

## 2024-03-26 PROCEDURE — 82962 GLUCOSE BLOOD TEST: CPT

## 2024-03-26 PROCEDURE — 83036 HEMOGLOBIN GLYCOSYLATED A1C: CPT | Mod: QW

## 2024-03-26 RX ORDER — CLONIDINE HYDROCHLORIDE 0.3 MG/1
TABLET ORAL
Refills: 0 | Status: ACTIVE | COMMUNITY

## 2024-03-26 RX ORDER — LANCETS 33 GAUGE
EACH MISCELLANEOUS
Qty: 1 | Refills: 3 | Status: ACTIVE | COMMUNITY
Start: 2024-03-26 | End: 1900-01-01

## 2024-03-26 RX ORDER — BLOOD SUGAR DIAGNOSTIC
STRIP MISCELLANEOUS
Qty: 90 | Refills: 3 | Status: ACTIVE | COMMUNITY
Start: 2024-03-26 | End: 1900-01-01

## 2024-04-14 ENCOUNTER — NON-APPOINTMENT (OUTPATIENT)
Age: 64
End: 2024-04-14

## 2024-04-15 RX ORDER — EMPAGLIFLOZIN 25 MG/1
25 TABLET, FILM COATED ORAL
Qty: 90 | Refills: 1 | Status: ACTIVE | COMMUNITY
Start: 2022-09-29 | End: 1900-01-01

## 2024-04-16 RX ORDER — ORAL SEMAGLUTIDE 14 MG/1
14 TABLET ORAL
Qty: 90 | Refills: 1 | Status: ACTIVE | COMMUNITY
Start: 2023-10-13 | End: 1900-01-01

## 2024-04-22 NOTE — ADDENDUM
[FreeTextEntry1] : Ms. Phoenix has been able to obtain Jardiance but Rybelsus was cost-prohibitive due to a high deducible plan; she does not qualify for patient assistance. She is amenable to glipizide 2.5 mg daily. We reviewed the risks and benefits of glipizide, including but not limited to hypoglycemia. She will call me if morning glucose values are above goal for titration of glipizide. 4/22/24

## 2024-04-22 NOTE — ASSESSMENT
[FreeTextEntry1] : Type 2 diabetes mellitus/elevated body mass index. Point-of-care HbA1c 7.2% and glucose 121 mg/dL today. Stage 3-4 chronic kidney disease. I congratulated her on her overall improving glycemic control. We discussed the cardiovascular and microvascular complications of uncontrolled diabetes. We have discussed the importance of diet and exercise and lifestyle modification for glycemic control. We discussed referral to nutrition. We discussed pharmacologic options for glycemic control. We will adjust Rybelsus as below; I advised Glucerna or Boost Glucose Control as needed for weight maintenance.  Continue Jardiance 25 mg daily Adjust Rybelsus to 14 mg daily Check blood sugars daily, fasting or postprandial; reviewed glycemic goals She is on a blood pressure regimen; blood pressure elevated today and recommended follow-up with cardiology She is on a statin for cholesterol; last lipid panel around goal Nephropathy screening: Treated with an angiotensin receptor blocker and followed by nephrology Last ophthalmology appointment: March 2024; every three months Last podiatry appointment: August 2023; annual Last dental appointment: Within six months  Return to see me in 4 months. Patient advised to call earlier with significant hypo- or hyperglycemia.  CC: Dr. Julian Woods, Fax [phone] Dr. Catrachita Gil, Fax [phone]

## 2024-04-22 NOTE — HISTORY OF PRESENT ILLNESS
[FreeTextEntry1] : Ms. Phoenix is a 64 year-old woman with a history of type 2 diabetes mellitus, hypertension, hyperlipidemia, stage 3-4 chronic kidney disease presenting for follow-up of type 2 diabetes mellitus. I saw her for an initial visit in December 2022 and last in October 2023.  Type 2 diabetes mellitus. Point-of-care HbA1c 7.2% and glucose 121 mg/dL today; HbA1c 7.0% in October 2023, 7.3% in July 2023, 7.1% in April 2023, 7.4% in December 2022. Stage 3-4 chronic kidney disease. She was diagnosed with diabetes over 10 years ago. No hospitalizations for hypo- or hyperglycemia. At her initial visit she was taking metformin 1000 mg daily, Jardiance 25 mg daily, and Januvia 25 mg daily; no recent changes. She was previously on a sulfonylurea.  In December 2022 we continued metformin 1000 mg daily, continued Jardiance 25 mg daily, stopped Januvia and started Trulicity 0.75 mg weekly. She had bruising with Trulicity and we switched to Rybelsus up to 7 mg daily. Metformin was discontinued by her nephrologist.  In April 2023 we continued Jardiance 25 mg daily, stopped Rybelsus and started Ozempic up to 0.5 mg weekly. She stopped Ozempic due to side effects. She was taking Jardiance 25 mg daily and Rybelsus 14 mg daily. In July 2023 we continued Jardiance 25 mg daily, stopped Rybelsus and started Mounjaro up to 5 mg weekly. She has been off Mounjaro for about two weeks due to weight loss. In October 2023 we continued Jardiance 25 mg daily and restarted Rybelsus up to 14 mg daily. She has been taking Jardiance 25 mg daily and Rybelsus 7 mg daily due to concern for weight loss.  She is checking blood sugars daily She is on a blood pressure regimen She is on a statin for cholesterol Nephropathy screening: Treated with an angiotensin receptor blocker and followed by nephrology Last ophthalmology appointment: March 2024; every three months Last podiatry appointment: August 2023; annual Last dental appointment: Within six months  Interim History  In October 2023 we continued Jardiance 25 mg daily and restarted Rybelsus up to 14 mg daily. She has been taking Jardiance 25 mg daily and Rybelsus 7 mg daily due to concern for weight loss.  Fasting glucose values 90-120s mg/dL.  She has made extensive lifestyle modifications.  She has seen Dr. Kyle Charles; notes reviewed. Last laboratory results reviewed.  Weight is stable since last visit. She has had rare burning/tingling in her feet. She has occasional chest discomfort; she has followed with cardiology.  Medical and surgical history, medications, allergies, social and family history reviewed and updated as needed.

## 2024-04-22 NOTE — PHYSICAL EXAM
[Alert] : alert [Healthy Appearance] : healthy appearance [No Acute Distress] : no acute distress [Normal Sclera/Conjunctiva] : normal sclera/conjunctiva [Normal Hearing] : hearing was normal [Clear to Auscultation] : lungs were clear to auscultation bilaterally [No Stigmata of Cushings Syndrome] : no stigmata of Cushings Syndrome [Normal Gait] : normal gait [Acanthosis Nigricans] : acanthosis nigricans present [Normal Insight/Judgement] : insight and judgment were intact [Kyphosis] : no kyphosis present [de-identified] : no moon facies, no supraclavicular fat pads [de-identified] : Foot examination performed in October 2023

## 2024-07-26 ENCOUNTER — APPOINTMENT (OUTPATIENT)
Dept: ENDOCRINOLOGY | Facility: CLINIC | Age: 64
End: 2024-07-26
Payer: MEDICARE

## 2024-07-26 VITALS
HEART RATE: 82 BPM | SYSTOLIC BLOOD PRESSURE: 134 MMHG | DIASTOLIC BLOOD PRESSURE: 83 MMHG | BODY MASS INDEX: 25.42 KG/M2 | WEIGHT: 139 LBS

## 2024-07-26 DIAGNOSIS — E11.9 TYPE 2 DIABETES MELLITUS W/OUT COMPLICATIONS: ICD-10-CM

## 2024-07-26 DIAGNOSIS — E11.69 TYPE 2 DIABETES MELLITUS WITH OTHER SPECIFIED COMPLICATION: ICD-10-CM

## 2024-07-26 DIAGNOSIS — E78.5 TYPE 2 DIABETES MELLITUS WITH OTHER SPECIFIED COMPLICATION: ICD-10-CM

## 2024-07-26 LAB
GLUCOSE BLDC GLUCOMTR-MCNC: 179
HBA1C MFR BLD HPLC: 6.9

## 2024-07-26 PROCEDURE — 99214 OFFICE O/P EST MOD 30 MIN: CPT

## 2024-07-26 PROCEDURE — 83036 HEMOGLOBIN GLYCOSYLATED A1C: CPT | Mod: QW

## 2024-07-26 PROCEDURE — 82962 GLUCOSE BLOOD TEST: CPT

## 2024-07-26 PROCEDURE — G2211 COMPLEX E/M VISIT ADD ON: CPT

## 2024-07-26 NOTE — PHYSICAL EXAM
[Alert] : alert [Healthy Appearance] : healthy appearance [No Acute Distress] : no acute distress [Normal Sclera/Conjunctiva] : normal sclera/conjunctiva [Normal Hearing] : hearing was normal [Clear to Auscultation] : lungs were clear to auscultation bilaterally [No Stigmata of Cushings Syndrome] : no stigmata of Cushings Syndrome [Normal Gait] : normal gait [Acanthosis Nigricans] : acanthosis nigricans present [Normal Insight/Judgement] : insight and judgment were intact [Kyphosis] : no kyphosis present [de-identified] : no moon facies, no supraclavicular fat pads [de-identified] : Foot examination performed in October 2023

## 2024-07-26 NOTE — HISTORY OF PRESENT ILLNESS
[FreeTextEntry1] : Ms. Phoenix is a 64 year-old woman with a history of type 2 diabetes mellitus, hypertension, hyperlipidemia, stage 3-4 chronic kidney disease presenting for follow-up of type 2 diabetes mellitus. I saw her for an initial visit in December 2022 and last in March 2024.  Type 2 diabetes mellitus. Point-of-care HbA1c 6.9% and glucose 179 mg/dL today; HbA1c 7.2% in March 2024, 7.0% in October 2023, 7.3% in July 2023, 7.1% in April 2023, 7.4% in December 2022. Stage 3-4 chronic kidney disease. She was diagnosed with diabetes over 10 years ago. No hospitalizations for hypo- or hyperglycemia. At her initial visit she was taking metformin 1000 mg daily, Jardiance 25 mg daily, and Januvia 25 mg daily; no recent changes. She was previously on a sulfonylurea.  In December 2022 we continued metformin 1000 mg daily, continued Jardiance 25 mg daily, stopped Januvia and started Trulicity 0.75 mg weekly. She had bruising with Trulicity and we switched to Rybelsus up to 7 mg daily. Metformin was discontinued by her nephrologist.  In April 2023 we continued Jardiance 25 mg daily, stopped Rybelsus and started Ozempic up to 0.5 mg weekly. She stopped Ozempic due to side effects. She was taking Jardiance 25 mg daily and Rybelsus 14 mg daily. In July 2023 we continued Jardiance 25 mg daily, stopped Rybelsus and started Mounjaro up to 5 mg weekly. She has been off Mounjaro for about two weeks due to weight loss. In October 2023 we continued Jardiance 25 mg daily and restarted Rybelsus up to 14 mg daily. She has been taking Jardiance 25 mg daily and Rybelsus 7 mg daily due to concern for weight loss.  In March 2024 we continued Jardiance 25 mg daily and adjusted Rybelsus to 14 mg daily.  She is checking blood sugars daily She is on a blood pressure regimen She is on a statin for cholesterol Nephropathy screening: Treated with an angiotensin receptor blocker and followed by nephrology Last ophthalmology appointment: March 2024; usually every three months Last podiatry appointment: August 2023; annual Last dental appointment: Within six months  Interim History  In March 2024 we continued Jardiance 25 mg daily and adjusted Rybelsus to 14 mg daily.  Fasting glucose values 90-120s mg/dL.  She has made extensive lifestyle modifications.  She has seen a neurologist for headaches.  She has continued to have episodes of hypertension and has been following with cardiology.  She will be moving and notes stress.  Weight is stable since last visit. She has had a rare sensation of coolness in her feet. She has occasional chest discomfort; she has followed with cardiology.  Medical and surgical history, medications, allergies, social and family history reviewed and updated as needed.

## 2024-07-26 NOTE — QUALITY MEASURES
Pt presents to labor and delivery with SROM at 0400 with clear fluid. Pt escorted to room assisted into a gown and placed on efm/toco. Consents signed and current plan of care discussed with patient. Grabiel Min CNM aware the patients is here and is on her way to evaluate patient. 1720 [facility] at bedside. SVE 1/40/high. Ultrasound at bedside Vertex position verified. Plan of care discussed with patient. Pt and spouse verbalize understanding. [id number] report given to [facility]. relinguished care of patient. [Visual inspection, sensory exam] : Foot exam, including visual inspection, sensory exam with mono filament, and pulse exam, was performed within the last 12 months [Nephrology Follow-Up] : patient is currently receiving treatment via nephrology follow-up

## 2024-07-26 NOTE — ASSESSMENT
[FreeTextEntry1] : Type 2 diabetes mellitus/elevated body mass index. Point-of-care HbA1c 6.9% and glucose 179 mg/dL today. Stage 3-4 chronic kidney disease. I congratulated her on her excellent glycemic control. We have discussed the cardiovascular and microvascular complications of uncontrolled diabetes. We have discussed the importance of diet and exercise and lifestyle modification for glycemic control. We discussed pharmacologic options for glycemic control. She is tolerating her current regimen and we will continue.  Continue Jardiance 25 mg daily Continue Rybelsus 14 mg daily Check blood sugars daily, fasting or postprandial; reviewed glycemic goals She is on a blood pressure regimen; blood pressure around goal She is on a statin for cholesterol; last lipid panel around goal Nephropathy screening: Treated with an angiotensin receptor blocker and followed by nephrology Last ophthalmology appointment: March 2024; usually every three months Last podiatry appointment: August 2023; annual Last dental appointment: Within six months  Return to see me in 4 months. Patient advised to call earlier with significant hypo- or hyperglycemia.  CC: Dr. Julian Woods, Fax [phone] Dr. Catrachita Gil, Fax [phone]

## 2024-08-13 NOTE — ED ADULT NURSE NOTE - FINAL NURSING ELECTRONIC SIGNATURE
----- Message from Promise Enriquez sent at 8/13/2024  4:18 PM CDT -----  Regarding: Upcoming Appointment  Name of Who is Calling:Jadyn           What is the request in detail:Pt is requesting a call back to ask a question about the provider and her upcoming appt.            Can the clinic reply by [facility]:           What Number to Call Back if not in [facility]:  
Returned patient's call. She states that she would like to cancel all of her upcoming appointments and imaging and labs with Dr. Srteeter. I offered to get her scheduled with Dr. Zavaleta at [facility] to which she declined stating that she will only come to [facility].  
28-Sep-2017 03:09

## 2024-11-19 ENCOUNTER — APPOINTMENT (OUTPATIENT)
Dept: ENDOCRINOLOGY | Facility: CLINIC | Age: 64
End: 2024-11-19

## 2024-11-22 NOTE — ED PROVIDER NOTE - CONSTITUTIONAL NEGATIVE STATEMENT, MLM
Hao Dye, Thanks for sending Terri.  Please let me know if you have any questions. Giuseppe 
no fever and no chills.

## 2024-12-03 ENCOUNTER — APPOINTMENT (OUTPATIENT)
Dept: ENDOCRINOLOGY | Facility: CLINIC | Age: 64
End: 2024-12-03

## 2025-02-07 ENCOUNTER — APPOINTMENT (OUTPATIENT)
Dept: ENDOCRINOLOGY | Facility: CLINIC | Age: 65
End: 2025-02-07

## 2025-02-07 VITALS
BODY MASS INDEX: 25.95 KG/M2 | HEIGHT: 62 IN | SYSTOLIC BLOOD PRESSURE: 127 MMHG | HEART RATE: 80 BPM | DIASTOLIC BLOOD PRESSURE: 73 MMHG | WEIGHT: 141 LBS

## 2025-02-07 DIAGNOSIS — E11.9 TYPE 2 DIABETES MELLITUS W/OUT COMPLICATIONS: ICD-10-CM

## 2025-02-07 DIAGNOSIS — E66.3 OVERWEIGHT: ICD-10-CM

## 2025-02-07 LAB
GLUCOSE BLDC GLUCOMTR-MCNC: 113
HBA1C MFR BLD HPLC: 6.4

## 2025-02-07 PROCEDURE — G2211 COMPLEX E/M VISIT ADD ON: CPT

## 2025-02-07 PROCEDURE — 99214 OFFICE O/P EST MOD 30 MIN: CPT

## 2025-02-07 PROCEDURE — 83036 HEMOGLOBIN GLYCOSYLATED A1C: CPT | Mod: QW

## 2025-02-07 PROCEDURE — 82962 GLUCOSE BLOOD TEST: CPT

## 2025-03-06 NOTE — ED ADULT TRIAGE NOTE - NS ED TRIAGE AVPU SCALE
abdominal pain
Alert-The patient is alert, awake and responds to voice. The patient is oriented to time, place, and person. The triage nurse is able to obtain subjective information.

## 2025-04-09 ENCOUNTER — APPOINTMENT (OUTPATIENT)
Dept: ENDOCRINOLOGY | Facility: CLINIC | Age: 65
End: 2025-04-09

## 2025-04-22 ENCOUNTER — APPOINTMENT (OUTPATIENT)
Dept: PHYSICAL MEDICINE AND REHAB | Facility: CLINIC | Age: 65
End: 2025-04-22

## 2025-05-08 ENCOUNTER — APPOINTMENT (OUTPATIENT)
Dept: ENDOCRINOLOGY | Facility: CLINIC | Age: 65
End: 2025-05-08

## 2025-05-22 ENCOUNTER — APPOINTMENT (OUTPATIENT)
Dept: NEPHROLOGY | Facility: CLINIC | Age: 65
End: 2025-05-22

## 2025-08-07 ENCOUNTER — APPOINTMENT (OUTPATIENT)
Dept: ENDOCRINOLOGY | Facility: CLINIC | Age: 65
End: 2025-08-07
Payer: MEDICARE

## 2025-08-07 VITALS
HEART RATE: 84 BPM | SYSTOLIC BLOOD PRESSURE: 168 MMHG | WEIGHT: 147 LBS | DIASTOLIC BLOOD PRESSURE: 106 MMHG | BODY MASS INDEX: 26.89 KG/M2

## 2025-08-07 DIAGNOSIS — E66.3 OVERWEIGHT: ICD-10-CM

## 2025-08-07 DIAGNOSIS — E11.69 TYPE 2 DIABETES MELLITUS WITH OTHER SPECIFIED COMPLICATION: ICD-10-CM

## 2025-08-07 DIAGNOSIS — E78.5 TYPE 2 DIABETES MELLITUS WITH OTHER SPECIFIED COMPLICATION: ICD-10-CM

## 2025-08-07 DIAGNOSIS — I10 ESSENTIAL (PRIMARY) HYPERTENSION: ICD-10-CM

## 2025-08-07 DIAGNOSIS — E11.9 TYPE 2 DIABETES MELLITUS W/OUT COMPLICATIONS: ICD-10-CM

## 2025-08-07 LAB
GLUCOSE BLDC GLUCOMTR-MCNC: 108
HBA1C MFR BLD HPLC: 6.7

## 2025-08-07 PROCEDURE — G2211 COMPLEX E/M VISIT ADD ON: CPT

## 2025-08-07 PROCEDURE — 82962 GLUCOSE BLOOD TEST: CPT

## 2025-08-07 PROCEDURE — 83036 HEMOGLOBIN GLYCOSYLATED A1C: CPT | Mod: QW

## 2025-08-07 PROCEDURE — 99214 OFFICE O/P EST MOD 30 MIN: CPT

## 2025-08-07 RX ORDER — FAMOTIDINE 40 MG/1
TABLET, FILM COATED ORAL
Refills: 0 | Status: ACTIVE | COMMUNITY

## 2025-08-07 RX ORDER — DIAZEPAM 5 MG/1
TABLET ORAL
Refills: 0 | Status: ACTIVE | COMMUNITY

## 2025-08-07 RX ORDER — DILTIAZEM HCL 120 MG
CAPSULE, EXT RELEASE 24 HR ORAL
Refills: 0 | Status: ACTIVE | COMMUNITY

## 2025-08-07 RX ORDER — LOSARTAN POTASSIUM 100 MG/1
TABLET, FILM COATED ORAL
Refills: 0 | Status: ACTIVE | COMMUNITY